# Patient Record
Sex: FEMALE | Race: WHITE | Employment: OTHER | URBAN - METROPOLITAN AREA
[De-identification: names, ages, dates, MRNs, and addresses within clinical notes are randomized per-mention and may not be internally consistent; named-entity substitution may affect disease eponyms.]

---

## 2017-10-27 PROBLEM — Z34.00 FIRST PREGNANCY: Status: ACTIVE | Noted: 2017-10-27

## 2018-01-10 PROBLEM — D25.1 INTRAMURAL LEIOMYOMA OF UTERUS: Status: ACTIVE | Noted: 2018-01-10

## 2018-02-22 PROBLEM — O99.012 ANEMIA AFFECTING PREGNANCY IN SECOND TRIMESTER: Status: ACTIVE | Noted: 2018-02-22

## 2018-02-28 ENCOUNTER — HOSPITAL ENCOUNTER (OUTPATIENT)
Dept: LAB | Age: 28
Discharge: HOME | End: 2018-02-28
Payer: COMMERCIAL

## 2018-02-28 DIAGNOSIS — O99.810 ABNORMAL GLUCOSE AFFECTING PREGNANCY: ICD-10-CM

## 2018-02-28 LAB
GLUCOSE P FAST SERPL-MCNC: 92 MG/DL
GLUCOSE, 1 HR,GTT1: 169 MG/DL
GLUCOSE, 2 HR,GTT2: 160 MG/DL
GLUCOSE, 3 HR,GTT3: 142 MG/DL

## 2018-02-28 PROCEDURE — 36415 COLL VENOUS BLD VENIPUNCTURE: CPT | Performed by: OBSTETRICS & GYNECOLOGY

## 2018-02-28 PROCEDURE — 82947 ASSAY GLUCOSE BLOOD QUANT: CPT | Performed by: OBSTETRICS & GYNECOLOGY

## 2018-02-28 PROCEDURE — 82952 GTT-ADDED SAMPLES: CPT | Performed by: OBSTETRICS & GYNECOLOGY

## 2018-02-28 PROCEDURE — 82951 GLUCOSE TOLERANCE TEST (GTT): CPT | Performed by: OBSTETRICS & GYNECOLOGY

## 2018-03-30 ENCOUNTER — HOSPITAL ENCOUNTER (EMERGENCY)
Age: 28
Discharge: HOME | End: 2018-03-30
Attending: OBSTETRICS & GYNECOLOGY | Admitting: OBSTETRICS & GYNECOLOGY
Payer: COMMERCIAL

## 2018-03-30 VITALS
HEART RATE: 76 BPM | RESPIRATION RATE: 18 BRPM | DIASTOLIC BLOOD PRESSURE: 71 MMHG | HEIGHT: 65 IN | BODY MASS INDEX: 27.99 KG/M2 | TEMPERATURE: 98.4 F | WEIGHT: 168 LBS | SYSTOLIC BLOOD PRESSURE: 128 MMHG

## 2018-03-30 PROBLEM — O26.893 ABDOMINAL PAIN DURING PREGNANCY, THIRD TRIMESTER: Status: ACTIVE | Noted: 2018-03-30

## 2018-03-30 PROBLEM — R10.9 ABDOMINAL PAIN DURING PREGNANCY, THIRD TRIMESTER: Status: ACTIVE | Noted: 2018-03-30

## 2018-03-30 LAB — FIBRONECTIN FETAL VAG QL: NEGATIVE

## 2018-03-30 PROCEDURE — 99283 EMERGENCY DEPT VISIT LOW MDM: CPT | Performed by: OBSTETRICS & GYNECOLOGY

## 2018-03-30 PROCEDURE — 82731 ASSAY OF FETAL FIBRONECTIN: CPT | Performed by: OBSTETRICS & GYNECOLOGY

## 2018-03-30 PROCEDURE — 59025 FETAL NON-STRESS TEST: CPT

## 2018-03-30 NOTE — ED PROVIDER NOTES
Chief Complaint:      24 y.o. female at 31w2d  weeks gestation who is seen for moderate abdominal pain. All day,  Working as , had back pain last night, now abdominal pain, feels \"tight\", from mid abdomen radiating to low pelvis. Rates pain 5/10, better now off her feet. Denies bleeding or LOF. Reports good fetal movement. Records reviewed, pregnancy uncomplicated thus far, 1 value abnormal on 3 hr GTT so sent for diet changes. Some constipation since starting Iron supplements. Denies bladder complaints. Reports good fetal movement. HISTORY:    History   Sexual Activity    Sexual activity: Yes    Partners: Male    Birth control/ protection: None     Comment: Pregnant     Patient's last menstrual period was 08/23/2017 (approximate). Social History     Social History    Marital status:      Spouse name: N/A    Number of children: N/A    Years of education: N/A     Occupational History    Not on file. Social History Main Topics    Smoking status: Former Smoker     Packs/day: 0.50     Years: 10.00     Quit date: 3/18/2017    Smokeless tobacco: Never Used    Alcohol use No      Comment: none since + UCG    Drug use: No    Sexual activity: Yes     Partners: Male     Birth control/ protection: None      Comment: Pregnant     Other Topics Concern    Not on file     Social History Narrative       History reviewed. No pertinent surgical history. Past Medical History:   Diagnosis Date    Anemia     History of chicken pox     in high school    Migraine          ROS:  A 12 point review of symptoms negative except for chief complaint as described above. PHYSICAL EXAM:  Blood pressure 128/71, pulse 76, temperature 98.4 °F (36.9 °C), resp. rate 18, height 5' 5\" (1.651 m), weight 76.2 kg (168 lb), last menstrual period 08/23/2017. Constitutional: The patient appears well, alert, oriented x 3. Cardiovascular: Heart RRR, no murmurs.    Respiratory: Lungs clear, no respiratory distress  GI: Abdomen soft, nontender, no guarding  No fundal tenderness  Musculoskeletal: no cva tenderness  Upper ext: no edema, reflexes +2  Lower ext: no edema, neg curtis's, reflexes +2  Skin: no rashes or lesions  Psychiatric:Mood/ Affect: appropriate  Genitourinary: SVE: closed/thick/high  FHT: 140's cat 1  TOCO: no contractions, occasional mild irratibility    Ffn negative, pt much more comfortable w resting. I personally reviewed pt's medical record including relevant labs and ultrasounds    Assessment/Plan: Abdominal pain, 31 wks, no evidence of  labor. Stable for home, follow up in office as scheduled. Discussed signs/sx PTL and indications for return.

## 2018-03-30 NOTE — PROGRESS NOTES
Pt presented to L&D triage complaining of abdominal pain 5/10. EFM on. Dr Whitney Lindsaychwaldemar called. Pt denies vaginal bleeding or LOF.

## 2018-04-09 ENCOUNTER — HOSPITAL ENCOUNTER (OUTPATIENT)
Dept: DIABETES SERVICES | Age: 28
Discharge: HOME | End: 2018-04-09
Payer: COMMERCIAL

## 2018-04-09 VITALS — WEIGHT: 170 LBS | HEIGHT: 65 IN | BODY MASS INDEX: 28.32 KG/M2

## 2018-04-09 PROCEDURE — G0109 DIAB MANAGE TRN IND/GROUP: HCPCS

## 2018-04-09 NOTE — PROGRESS NOTES
Gestational Diabetes Self-Management Support Plan    Services Provided: Pt received education on nutrition and meal planning during pregnancy. Emotional support for adjustment to diagnosis was provided. Care Plan/Recommendations:  Pt instructed to record blood sugar 4x/day and record all meals and snacks. Pt to bring information to appointments with OB. Notes for Follow Up:   1. Barriers to checking blood glucose and adherence to meal plan identified:   Pt not checking blood glucose at this time, reports she diagnosed as borderline and is hopeful to make improvements to diet to prevent any further complications with blood glucose levels. Pt to follow up with OB for monitoring.       Niya Rivera MS, RD, LD  [facility]   [phone]

## 2018-05-02 LAB — GRBS, EXTERNAL: NORMAL

## 2018-05-14 ENCOUNTER — HOSPITAL ENCOUNTER (EMERGENCY)
Age: 28
Discharge: HOME | End: 2018-05-14
Attending: OBSTETRICS & GYNECOLOGY | Admitting: OBSTETRICS & GYNECOLOGY
Payer: COMMERCIAL

## 2018-05-14 VITALS
SYSTOLIC BLOOD PRESSURE: 128 MMHG | TEMPERATURE: 98.1 F | HEART RATE: 100 BPM | DIASTOLIC BLOOD PRESSURE: 77 MMHG | RESPIRATION RATE: 16 BRPM

## 2018-05-14 PROBLEM — R10.9 ABDOMINAL PAIN DURING PREGNANCY IN THIRD TRIMESTER: Status: ACTIVE | Noted: 2018-05-14

## 2018-05-14 PROBLEM — O26.893 ABDOMINAL PAIN DURING PREGNANCY IN THIRD TRIMESTER: Status: ACTIVE | Noted: 2018-05-14

## 2018-05-14 PROCEDURE — 59025 FETAL NON-STRESS TEST: CPT | Performed by: OBSTETRICS & GYNECOLOGY

## 2018-05-14 PROCEDURE — 99283 EMERGENCY DEPT VISIT LOW MDM: CPT | Performed by: OBSTETRICS & GYNECOLOGY

## 2018-05-14 PROCEDURE — 99283 EMERGENCY DEPT VISIT LOW MDM: CPT

## 2018-05-14 NOTE — IP AVS SNAPSHOT
[address] 
[phone] Patient: Dominic Dumont MRN: [id number] JIV: About your hospitalization You were admitted on:  N/A You last received care in the:  [facility] [place], [place] 4 MADELYN You were discharged on:  May 14, 2018 Why you were hospitalized Your primary diagnosis was:  Not on File Your diagnoses also included:  Abdominal Pain During Pregnancy In Third Trimester Follow-up Information Follow up With Details Comments Contact Info JOSE Aleman   [id number] [facility] [address] 
[phone] Your Scheduled Appointments Thursday May 17, 2018 10:25 AM EDT  
OB VISIT with Yesi Silva MD  
[facility]) [address]  [address]  
[phone] Discharge Orders None A check tawanda indicates which time of day the medication should be taken. My Medications ASK your doctor about these medications Instructions Each Dose to Equal  
 Morning Noon Evening Bedtime  
 ferrous sulfate 325 mg (65 mg iron) tablet Commonly known as:  Iron Your last dose was: Your next dose is: Take 1 Tab by mouth three (3) times daily (with meals). 325 mg  
    
   
   
   
  
 ONE-A-DAY WOMEN'S PRENATAL 1 28 mg iron- 800 mcg-235 mg Cap Generic drug:  prenatal 123-iron-folic-[id number] Your last dose was: Your next dose is: Take  by mouth. Discharge Instructions Pregnancy Precautions: Care Instructions Your Care Instructions There is no sure way to prevent labor before your due date ( labor) or to prevent most other pregnancy problems. But there are things you can do to increase your chances of a healthy pregnancy. Go to your appointments, follow your doctor's advice, and take good care of yourself. Eat well, and exercise (if your doctor agrees). And make sure to drink plenty of water. Follow-up care is a key part of your treatment and safety. Be sure to make and go to all appointments, and call your doctor if you are having problems. It's also a good idea to know your test results and keep a list of the medicines you take. How can you care for yourself at home? · Make sure you go to your prenatal appointments. At each visit, your doctor will check your blood pressure. Your doctor will also check to see if you have protein in your urine. High blood pressure and protein in urine are signs of preeclampsia. This condition can be dangerous for you and your baby. · Drink plenty of fluids, enough so that your urine is light yellow or clear like water. Dehydration can cause contractions. If you have kidney, heart, or liver disease and have to limit fluids, talk with your doctor before you increase the amount of fluids you drink. · Tell your doctor right away if you notice any symptoms of an infection, such as: ¨ Burning when you urinate. ¨ A foul-smelling discharge from your vagina. ¨ Vaginal itching. ¨ Unexplained fever. ¨ Unusual pain or soreness in your uterus or lower belly. · Eat a balanced diet. Include plenty of foods that are high in calcium and iron. ¨ Foods high in calcium include milk, cheese, yogurt, almonds, and broccoli. ¨ Foods high in iron include red meat, shellfish, poultry, eggs, beans, raisins, whole-grain bread, and leafy green vegetables. · Do not smoke. If you need help quitting, talk to your doctor about stop-smoking programs and medicines. These can increase your chances of quitting for good. · Do not drink alcohol or use illegal drugs. · Follow your doctor's directions about activity. Your doctor will let you know how much, if any, exercise you can do. · Ask your doctor if you can have sex. If you are at risk for early labor, your doctor may ask you to not have sex. · Take care to prevent falls. During pregnancy, your joints are loose, and your balance is off. Sports such as bicycling, skiing, or in-line skating can increase your risk of falling. And don't ride horses or motorcycles, dive, water ski, scuba dive, or parachute jump while you are pregnant. · Avoid getting very hot. Do not use saunas or hot tubs. Avoid staying out in the sun in hot weather for long periods. Take acetaminophen (Tylenol) to lower a high fever. · Do not take any over-the-counter or herbal medicines or supplements without talking to your doctor or pharmacist first. 
When should you call for help? Call 911 anytime you think you may need emergency care. For example, call if: 
? · You passed out (lost consciousness). ? · You have severe vaginal bleeding. ? · You have severe pain in your belly or pelvis. ? · You have had fluid gushing or leaking from your vagina and you know or think the umbilical cord is bulging into your vagina. If this happens, immediately get down on your knees so your rear end (buttocks) is higher than your head. This will decrease the pressure on the cord until help arrives. ?Call your doctor now or seek immediate medical care if: 
? · You have signs of preeclampsia, such as: 
¨ Sudden swelling of your face, hands, or feet. ¨ New vision problems (such as dimness or blurring). ¨ A severe headache. ? · You have any vaginal bleeding. ? · You have belly pain or cramping. ? · You have a fever. ? · You have had regular contractions (with or without pain) for an hour. This means that you have 8 or more within 1 hour or 4 or more in 20 minutes after you change your position and drink fluids. ? · You have a sudden release of fluid from your vagina. ? · You have low back pain or pelvic pressure that does not go away.   
? · You notice that your baby has stopped moving or is moving much less than normal.  
 ?Watch closely for changes in your health, and be sure to contact your doctor if you have any problems. Where can you learn more? Go to http://francheska-erasmo.info/. Enter [id number] in the search box to learn more about \"Pregnancy Precautions: Care Instructions. \" Current as of: March 16, 2017 Content Version: 11.4 © 9204-2960 [employer]. Care instructions adapted under license by [employer] (which disclaims liability or warranty for this information). If you have questions about a medical condition or this instruction, always ask your healthcare professional. [address] any warranty or liability for your use of this information. Introducing [facility] [place] [facility]! Dear Jahaira Jara: Thank you for requesting a [employer] account. Our records indicate that you already have an active [employer] account. You can access your account anytime at https://[employer]. [employer] Did you know that you can access your hospital and ER discharge instructions at any time in [employer]? You can also review all of your test results from your hospital stay or ER visit. Additional Information If you have questions, please visit the Frequently Asked Questions section of the [employer] website at https://[employer]. [employer]/. Remember, THER[employer] is NOT to be used for urgent needs. For medical emergencies, dial 911. Now available from your iPhone and Android! Introducing Nicolas Sun As a [facility] patient, I wanted to make you aware of our electronic visit tool called Nicolas Sun. [facility] 24/7 allows you to connect within minutes with a medical provider 24 hours a day, seven days a week via a mobile device or tablet or logging into a secure website from your computer. You can access Nicolas Sun from anywhere in the [place].  
 
A virtual visit might be right for you when you have a simple condition and feel like you just dont want to get out of bed, or cant get away from work for an appointment, when your regular [employer] provider is not available (evenings, weekends or holidays), or when youre out of town and need minor care. Electronic visits cost only $49 and if the [employer] 24/7 provider determines a prescription is needed to treat your condition, one can be electronically transmitted to a nearby pharmacy*. Please take a moment to enroll today if you have not already done so. The enrollment process is free and takes just a few minutes. To enroll, please download the [employer] 24/7 christine to your tablet or phone, or visit [email or web address] to enroll on your computer. And, as an [address] [place] [address] patient with a [employer] account, the results of your visits will be scanned into your electronic medical record and your primary care provider will be able to view the scanned results. We urge you to continue to see your regular [employer] provider for your ongoing medical care. And while your primary care provider may not be the one available when you seek a "[employer]" virtual visit, the peace of mind you get from getting a real diagnosis real time can be priceless. For more information on "[employer]", view our Frequently Asked Questions (FAQs) at [email or web address]. Sincerely, 
 
Nicola Benavidez MD 
Chief Medical Officer Charlie Pia Connors *:  certain medications cannot be prescribed via "[employer]" Providers Seen During Your Hospitalization Provider Specialty Primary office phone Robert Bolivar MD Obstetrics & Gynecology [phone] Your Primary Care Physician (PCP) Primary Care Physician Office Phone Office Fax Piyush Bravo [phone] You are allergic to the following No active allergies Recent Documentation OB Status Smoking Status Pregnant Former Smoker Emergency Contacts Name Discharge Info Relation Home Work Mobile Esteban Beckman  Spouse [3] [phone] Patient Belongings The following personal items are in your possession at time of discharge: 
                             
 
  
  
 Please provide this summary of care documentation to your next provider. Signatures-by signing, you are acknowledging that this After Visit Summary has been reviewed with you and you have received a copy. Patient Signature:  ____________________________________________________________ Date:  ____________________________________________________________  
  
Noel Lipscomb Provider Signature:  ____________________________________________________________ Date:  ____________________________________________________________

## 2018-05-14 NOTE — PROGRESS NOTES
Pt arrived to L&D triage with c/o vaginal dark clot. Dr. Annmarie Homans in room, SVE 3/80/-2. Orders to continue monitoring until further notice.

## 2018-05-14 NOTE — ED PROVIDER NOTES
Chief Complaint:vag bleeding      24 y.o. female G1 at 37w5d  weeks gestation who is seen for vaginal bleeding. Pt reports she was checked in the office today by Dr. Juan Ge. When she arrived home, she had a blood clot that she passed vaginally. Good fetal movement. Reports mild to moderate contractions. No loss of fluid. Good fetal movement. No other c/o        HISTORY:    History   Sexual Activity    Sexual activity: Yes    Partners: Male    Birth control/ protection: None     Comment: Pregnant     Patient's last menstrual period was 08/23/2017 (approximate). Social History     Social History    Marital status:      Spouse name: N/A    Number of children: N/A    Years of education: N/A     Occupational History    Not on file. Social History Main Topics    Smoking status: Former Smoker     Packs/day: 0.50     Years: 10.00     Quit date: 3/18/2017    Smokeless tobacco: Never Used    Alcohol use No      Comment: none since + UCG    Drug use: No    Sexual activity: Yes     Partners: Male     Birth control/ protection: None      Comment: Pregnant     Other Topics Concern    Not on file     Social History Narrative       History reviewed. No pertinent surgical history. Past Medical History:   Diagnosis Date    Anemia     History of chicken pox     in high school    Migraine          ROS:  A 12 point review of symptoms negative except for chief complaint as described above. PHYSICAL EXAM:  Blood pressure 128/77, pulse 100, temperature 98.1 °F (36.7 °C), resp. rate 16, last menstrual period 08/23/2017. Constitutional: The patient appears well, alert, oriented x 3. Cardiovascular: Heart RRR, no murmurs.    Respiratory: Lungs clear, no respiratory distress  GI: Abdomen soft, nontender, no guarding  No fundal tenderness  Musculoskeletal: no cva tenderness  Upper ext: no edema, reflexes +2  Lower ext: no edema, neg curtis's, reflexes +2  Skin: no rashes or lesions  Psychiatric:Mood/ Affect: appropriate  Genitourinary: SVE:4/80/-2  FHT:reactive, cat 1  TOCO: q 2-5    I personally reviewed pt's medical record including relevant labs and ultrasounds    Assessment/Plan:  23 yo G1 at 37w5d with vag bleeding- suspect from cervical change  A+  Reactive nst  Home with precautions

## 2018-05-14 NOTE — DISCHARGE INSTRUCTIONS
Pregnancy Precautions: Care Instructions  Your Care Instructions    There is no sure way to prevent labor before your due date ( labor) or to prevent most other pregnancy problems. But there are things you can do to increase your chances of a healthy pregnancy. Go to your appointments, follow your doctor's advice, and take good care of yourself. Eat well, and exercise (if your doctor agrees). And make sure to drink plenty of water. Follow-up care is a key part of your treatment and safety. Be sure to make and go to all appointments, and call your doctor if you are having problems. It's also a good idea to know your test results and keep a list of the medicines you take. How can you care for yourself at home? · Make sure you go to your prenatal appointments. At each visit, your doctor will check your blood pressure. Your doctor will also check to see if you have protein in your urine. High blood pressure and protein in urine are signs of preeclampsia. This condition can be dangerous for you and your baby. · Drink plenty of fluids, enough so that your urine is light yellow or clear like water. Dehydration can cause contractions. If you have kidney, heart, or liver disease and have to limit fluids, talk with your doctor before you increase the amount of fluids you drink. · Tell your doctor right away if you notice any symptoms of an infection, such as:  ¨ Burning when you urinate. ¨ A foul-smelling discharge from your vagina. ¨ Vaginal itching. ¨ Unexplained fever. ¨ Unusual pain or soreness in your uterus or lower belly. · Eat a balanced diet. Include plenty of foods that are high in calcium and iron. ¨ Foods high in calcium include milk, cheese, yogurt, almonds, and broccoli. ¨ Foods high in iron include red meat, shellfish, poultry, eggs, beans, raisins, whole-grain bread, and leafy green vegetables. · Do not smoke.  If you need help quitting, talk to your doctor about stop-smoking programs and medicines. These can increase your chances of quitting for good. · Do not drink alcohol or use illegal drugs. · Follow your doctor's directions about activity. Your doctor will let you know how much, if any, exercise you can do. · Ask your doctor if you can have sex. If you are at risk for early labor, your doctor may ask you to not have sex. · Take care to prevent falls. During pregnancy, your joints are loose, and your balance is off. Sports such as bicycling, skiing, or in-line skating can increase your risk of falling. And don't ride horses or motorcycles, dive, water ski, scuba dive, or parachute jump while you are pregnant. · Avoid getting very hot. Do not use saunas or hot tubs. Avoid staying out in the sun in hot weather for long periods. Take acetaminophen (Tylenol) to lower a high fever. · Do not take any over-the-counter or herbal medicines or supplements without talking to your doctor or pharmacist first.  When should you call for help? Call 911 anytime you think you may need emergency care. For example, call if:  ? · You passed out (lost consciousness). ? · You have severe vaginal bleeding. ? · You have severe pain in your belly or pelvis. ? · You have had fluid gushing or leaking from your vagina and you know or think the umbilical cord is bulging into your vagina. If this happens, immediately get down on your knees so your rear end (buttocks) is higher than your head. This will decrease the pressure on the cord until help arrives. ?Call your doctor now or seek immediate medical care if:  ? · You have signs of preeclampsia, such as:  ¨ Sudden swelling of your face, hands, or feet. ¨ New vision problems (such as dimness or blurring). ¨ A severe headache. ? · You have any vaginal bleeding. ? · You have belly pain or cramping. ? · You have a fever. ? · You have had regular contractions (with or without pain) for an hour.  This means that you have 8 or more within 1 hour or 4 or more in 20 minutes after you change your position and drink fluids. ? · You have a sudden release of fluid from your vagina. ? · You have low back pain or pelvic pressure that does not go away. ? · You notice that your baby has stopped moving or is moving much less than normal.   ? Watch closely for changes in your health, and be sure to contact your doctor if you have any problems. Where can you learn more? Go to http://francheska-erasmo.info/. Enter [id number] in the search box to learn more about \"Pregnancy Precautions: Care Instructions. \"  Current as of: March 16, 2017  Content Version: 11.4  © 3065-0368 [employer]. Care instructions adapted under license by [employer] (which disclaims liability or warranty for this information). If you have questions about a medical condition or this instruction, always ask your healthcare professional. [address] any warranty or liability for your use of this information.

## 2018-05-16 ENCOUNTER — HOSPITAL ENCOUNTER (EMERGENCY)
Age: 28
Discharge: HOME | End: 2018-05-16
Attending: OBSTETRICS & GYNECOLOGY | Admitting: OBSTETRICS & GYNECOLOGY
Payer: COMMERCIAL

## 2018-05-16 VITALS
RESPIRATION RATE: 20 BRPM | SYSTOLIC BLOOD PRESSURE: 127 MMHG | DIASTOLIC BLOOD PRESSURE: 80 MMHG | TEMPERATURE: 98.7 F | HEIGHT: 65 IN | BODY MASS INDEX: 29.16 KG/M2 | HEART RATE: 96 BPM | WEIGHT: 175 LBS

## 2018-05-16 PROCEDURE — 59025 FETAL NON-STRESS TEST: CPT

## 2018-05-16 PROCEDURE — 99283 EMERGENCY DEPT VISIT LOW MDM: CPT

## 2018-05-16 NOTE — DISCHARGE INSTRUCTIONS
Early Stage of Labor at Home: Care Instructions  Your Care Instructions  If you came to the hospital while in early labor, your doctor may have asked if you want to labor at home until your contractions are stronger. Many women stay at home during early labor. This is often the longest part of the birthing process. It may last up to 2 to 3 days. Contractions are mild to moderate and shorter (about 30 to 45 seconds). You can usually keep talking during them. Contractions may also be irregular, about 5 to 20 minutes apart. They may even stop for a while. It helps to stay as relaxed as you can during this time. You can spend some or all of your early labor at home or anywhere else you may be comfortable. If you live far from the hospital or birthing center, you may want to think about going somewhere nearby so you can get back to the hospital quickly. For some women, there may be benefits to staying home during early labor, such as avoiding medicines or procedures. As labor progresses, you'll shift from early labor to active labor. During this time, contractions get more intense. They occur more often, about every 2 to 3 minutes. They also last longer, about 50 to 70 seconds. You will feel them even when you change positions and walk or move around. It may be hard to tell if you are in active labor. If you aren't sure, call your doctor or midwife. As your labor progresses, check in with your doctor or midwife about when to come back to the hospital or birthing center. You may have special instructions if your water broke or you tested positive for group B strep. Follow-up care is a key part of your treatment and safety. Be sure to make and go to all appointments, and call your doctor if you are having problems. It's also a good idea to know your test results and keep a list of the medicines you take. How can you care for yourself at home? · Get support.  Having a support person with you from early labor until after childbirth can have a positive effect on childbirth. · Find distractions. During early labor, you can walk, play cards, watch TV, or listen to music to help take your mind off your contractions. · Ask your partner, labor , or  for a massage. Shoulder and low back massage during contractions may ease your pain. Strong massage of the back muscles (counterpressure) during contractions may help relieve the pain of back labor. Tell your labor  exactly where to push and how hard to push. · Use imagery. This means using your imagination to decrease your pain. For instance, to help manage pain, picture your contractions as waves rolling over you. Picture a peaceful place, such as a beach or mountain stream, to help you relax between contractions. · Change positions during labor. Walking, kneeling, or sitting on a big rubber ball (birth ball) are good options. · Use focused breathing techniques. Breathing in a rhythm can distract you from pain. · Take a warm shower or bath. Warm water may ease pain and stress. When should you call for help? Call 911 anytime you think you may need emergency care. For example, call if:  ? · You passed out (lost consciousness). ? · You have severe vaginal bleeding. ? · You have severe pain in your belly or pelvis. ? · You have had fluid gushing or leaking from your vagina and you know or think the umbilical cord is bulging into your vagina. If this happens, immediately get down on your knees so your rear end (buttocks) is higher than your head. This will decrease the pressure on the cord until help arrives. ?Call your doctor now or seek immediate medical care if:  ? · You have new or worse signs of preeclampsia, such as:  ¨ Sudden swelling of your face, hands, or feet. ¨ New vision problems (such as dimness or blurring). ¨ A severe headache. ? · You have any vaginal bleeding. ? · You have belly pain or cramping. ? · You have a fever.    ? · You have had regular contractions (with or without pain) for an hour. This means that you have 8 or more within 1 hour or 4 or more in 20 minutes after you change your position and drink fluids. ? · You have a sudden release of fluid from your vagina. ? · You have low back pain or pelvic pressure that does not go away. ? · You notice that your baby has stopped moving or is moving much less than normal.   ? Watch closely for changes in your health, and be sure to contact your doctor if you have any problems. Where can you learn more? Go to http://francheska-erasmo.info/. Enter M414 in the search box to learn more about \"Early Stage of Labor at Home: Care Instructions. \"  Current as of: March 16, 2017  Content Version: 11.4  © 3856-6315 [employer]. Care instructions adapted under license by [facility] (which disclaims liability or warranty for this information). If you have questions about a medical condition or this instruction, always ask your healthcare professional. [address] any warranty or liability for your use of this information.

## 2018-05-16 NOTE — IP AVS SNAPSHOT
[address] 
[phone] Patient: Tamanna Garcia MRN: [id number] PFR:0/27/6731 About your hospitalization You were admitted on:  N/A You last received care in the:  [facility] You were discharged on:  May 16, 2018 Why you were hospitalized Your primary diagnosis was:  Not on File Your diagnoses also included:  Abdominal Pain During Pregnancy In Third Trimester Follow-up Information None Your Scheduled Appointments Thursday May 17, 2018 10:25 AM EDT  
OB VISIT with Keeley Haley MD  
[facility]) [address]  [address] [phone] Discharge Orders None A check tawanda indicates which time of day the medication should be taken. My Medications ASK your doctor about these medications Instructions Each Dose to Equal  
 Morning Noon Evening Bedtime  
 ferrous sulfate 325 mg (65 mg iron) tablet Commonly known as:  Iron Your last dose was: Your next dose is: Take 1 Tab by mouth three (3) times daily (with meals). 325 mg  
    
   
   
   
  
 ONE-A-DAY WOMEN'S PRENATAL 1 28 mg iron- 800 mcg-235 mg Cap Generic drug:  prenatal 123-iron-folic-omeg3s Your last dose was: Your next dose is: Take  by mouth. Discharge Instructions Early Stage of Labor at Home: Care Instructions Your Care Instructions If you came to the hospital while in early labor, your doctor may have asked if you want to labor at home until your contractions are stronger. Many women stay at home during early labor. This is often the longest part of the birthing process. It may last up to 2 to 3 days. Contractions are mild to moderate and shorter (about 30 to 45 seconds).  You can usually keep talking during them. Contractions may also be irregular, about 5 to 20 minutes apart. They may even stop for a while. It helps to stay as relaxed as you can during this time. You can spend some or all of your early labor at home or anywhere else you may be comfortable. If you live far from the hospital or birthing center, you may want to think about going somewhere nearby so you can get back to the hospital quickly. For some women, there may be benefits to staying home during early labor, such as avoiding medicines or procedures. As labor progresses, you'll shift from early labor to active labor. During this time, contractions get more intense. They occur more often, about every 2 to 3 minutes. They also last longer, about 50 to 70 seconds. You will feel them even when you change positions and walk or move around. It may be hard to tell if you are in active labor. If you aren't sure, call your doctor or midwife. As your labor progresses, check in with your doctor or midwife about when to come back to the hospital or birthing center. You may have special instructions if your water broke or you tested positive for group B strep. Follow-up care is a key part of your treatment and safety. Be sure to make and go to all appointments, and call your doctor if you are having problems. It's also a good idea to know your test results and keep a list of the medicines you take. How can you care for yourself at home? · Get support. Having a support person with you from early labor until after childbirth can have a positive effect on childbirth. · Find distractions. During early labor, you can walk, play cards, watch TV, or listen to music to help take your mind off your contractions. · Ask your partner, labor , or  for a massage. Shoulder and low back massage during contractions may ease your pain.  Strong massage of the back muscles (counterpressure) during contractions may help relieve the pain of back labor. Tell your labor  exactly where to push and how hard to push. · Use imagery. This means using your imagination to decrease your pain. For instance, to help manage pain, picture your contractions as waves rolling over you. Picture a peaceful place, such as a beach or mountain stream, to help you relax between contractions. · Change positions during labor. Walking, kneeling, or sitting on a big rubber ball (birth ball) are good options. · Use focused breathing techniques. Breathing in a rhythm can distract you from pain. · Take a warm shower or bath. Warm water may ease pain and stress. When should you call for help? Call 911 anytime you think you may need emergency care. For example, call if: 
? · You passed out (lost consciousness). ? · You have severe vaginal bleeding. ? · You have severe pain in your belly or pelvis. ? · You have had fluid gushing or leaking from your vagina and you know or think the umbilical cord is bulging into your vagina. If this happens, immediately get down on your knees so your rear end (buttocks) is higher than your head. This will decrease the pressure on the cord until help arrives. ?Call your doctor now or seek immediate medical care if: 
? · You have new or worse signs of preeclampsia, such as: 
¨ Sudden swelling of your face, hands, or feet. ¨ New vision problems (such as dimness or blurring). ¨ A severe headache. ? · You have any vaginal bleeding. ? · You have belly pain or cramping. ? · You have a fever. ? · You have had regular contractions (with or without pain) for an hour. This means that you have 8 or more within 1 hour or 4 or more in 20 minutes after you change your position and drink fluids. ? · You have a sudden release of fluid from your vagina. ? · You have low back pain or pelvic pressure that does not go away.   
? · You notice that your baby has stopped moving or is moving much less than normal.  
 ?Watch closely for changes in your health, and be sure to contact your doctor if you have any problems. Where can you learn more? Go to http://francheska-erasmo.info/. Enter V461 in the search box to learn more about \"Early Stage of Labor at Home: Care Instructions. \" Current as of: March 16, 2017 Content Version: 11.4 © 0230-7184 [employer]. Care instructions adapted under license by [employer] (which disclaims liability or warranty for this information). If you have questions about a medical condition or this instruction, always ask your healthcare professional. [address] any warranty or liability for your use of this information. Introducing [facility]! Dear [facility]: Thank you for requesting a [employer] account. Our records indicate that you already have an active [employer] account. You can access your account anytime at [email or web address] Did you know that you can access your hospital and ER discharge instructions at any time in [employer]? You can also review all of your test results from your hospital stay or ER visit. Additional Information If you have questions, please visit the Frequently Asked Questions section of the [employer] website at [email or web address] Remember, [employer] is NOT to be used for urgent needs. For medical emergencies, dial 911. Now available from your iPhone and Android! Introducing Nicolas Sun As a [facility] patient, I wanted to make you aware of our electronic visit tool called Nicolas Sun. [facility] 24/7 allows you to connect within minutes with a medical provider 24 hours a day, seven days a week via a mobile device or tablet or logging into a secure website from your computer. You can access Nicolas Sun from anywhere in the [place].  
 
A virtual visit might be right for you when you have a simple condition and feel like you just dont want to get out of bed, or cant get away from work for an appointment, when your regular [facility] provider is not available (evenings, weekends or holidays), or when youre out of town and need minor care. Electronic visits cost only $49 and if the [facility] 24/7 provider determines a prescription is needed to treat your condition, one can be electronically transmitted to a nearby pharmacy*. Please take a moment to enroll today if you have not already done so. The enrollment process is free and takes just a few minutes. To enroll, please download the tidy/Teran[employer] christine to your tablet or phone, or visit [email or web address] to enroll on your computer. And, as an [address] [place] [address] patient with a [employer] account, the results of your visits will be scanned into your electronic medical record and your primary care provider will be able to view the scanned results. We urge you to continue to see your regular [facility] provider for your ongoing medical care. And while your primary care provider may not be the one available when you seek a [employer] virtual visit, the peace of mind you get from getting a real diagnosis real time can be priceless. For more information on Nicolas [employer], view our Frequently Asked Questions (FAQs) at [email or web address]. Sincerely, 
 
Rita Hernandez MD 
Chief Medical Officer [employer] *:  certain medications cannot be prescribed via [employer] Providers Seen During Your Hospitalization Provider Specialty Primary office phone Sara Hernández MD Obstetrics & Gynecology [phone] Your Primary Care Physician (PCP) Primary Care Physician Office Phone Office Fax Pauline Owens [phone] You are allergic to the following No active allergies Recent Documentation Height Weight BMI OB Status Smoking Status 1.651 m 79.4 kg 29.12 kg/m2 Pregnant Former Smoker Emergency Contacts Name Discharge Info Relation Home Work Mobile Esteban Beckman  Spouse [3] [phone] Patient Belongings The following personal items are in your possession at time of discharge: 
                             
 
  
  
 Please provide this summary of care documentation to your next provider. Signatures-by signing, you are acknowledging that this After Visit Summary has been reviewed with you and you have received a copy. Patient Signature:  ____________________________________________________________ Date:  ____________________________________________________________  
  
[facility] Provider Signature:  ____________________________________________________________ Date:  ____________________________________________________________

## 2018-05-16 NOTE — PROGRESS NOTES
Patient without complaints contractions have spaced out to every 8-10 minutes significant other at bedside.

## 2018-05-16 NOTE — IP AVS SNAPSHOT
[address] 
[phone] Patient: Anne Espino MRN: [id number] AFE:5/38/9328 A check tawanda indicates which time of day the medication should be taken. My Medications ASK your doctor about these medications Instructions Each Dose to Equal  
 Morning Noon Evening Bedtime  
 ferrous sulfate 325 mg (65 mg iron) tablet Commonly known as:  Iron Your last dose was: Your next dose is: Take 1 Tab by mouth three (3) times daily (with meals). 325 mg  
    
   
   
   
  
 ONE-A-DAY WOMEN'S PRENATAL 1 28 mg iron- 800 mcg-235 mg Cap Generic drug:  prenatal 123-iron-folic-omeg3s Your last dose was: Your next dose is: Take  by mouth.

## 2018-05-16 NOTE — H&P
CC  No chief complaint on file. History:    24 y.o. female at 38w0d weeks gestation who requesting evaluation for Uterine contractions. Was checked 2 days ago and was 80/4. Contractions started about 1.5 hours ago. No significant vb/no lof. Fetal movement has been normal  HISTORY:  OB History    Para Term  AB Living   1        SAB TAB Ectopic Molar Multiple Live Births              # Outcome Date GA Lbr Jeremiah/2nd Weight Sex Delivery Anes PTL Lv   1 Current                   History   Sexual Activity    Sexual activity: Yes    Partners: Male    Birth control/ protection: None     Comment: Pregnant     Patient's last menstrual period was 2017 (approximate). Social History     Social History    Marital status:      Spouse name: N/A    Number of children: N/A    Years of education: N/A     Occupational History    Not on file. Social History Main Topics    Smoking status: Former Smoker     Packs/day: 0.50     Years: 10.00     Quit date: 3/18/2017    Smokeless tobacco: Never Used    Alcohol use No      Comment: none since + UCG    Drug use: No    Sexual activity: Yes     Partners: Male     Birth control/ protection: None      Comment: Pregnant     Other Topics Concern    Not on file     Social History Narrative       No past surgical history on file. Past Medical History:   Diagnosis Date    Anemia     History of chicken pox     in high school    Migraine          ROS:  Negative:  headache , nausea and vomiting, vaginal bleeding  and visual disturbances. Positive:  contractions. PHYSICAL EXAM:  Last menstrual period 2017. General: well developed and well nourished  Resp:  breath sounds clear and equal bilaterally  Card:  RRR, no MRG  Abd: WNL.      Uterine contractions: regular, every 4 minutes    Fetal Assessment: Baseline FHR: 140s per minute     Fetal heart variability: moderate     Fetal Heart Rate decelerations: none     Fetal Heart Rate accelerations: yes     Prestentation: vertex by exam,    Pelvic:   External- normal EGBSU w/o lesions     SVE- Cervical Exam: 4 cm dilated    80% effaced    -1 station    Presenting Part: cephalic     Ext: edema, clonus and DTR's normal    I have personally reviewed the patient's history, prenatal record, and pertinent test results. Assessment:  24 y.o. female at 38w0d weeks gestation  Reassuring fetal status  Early latent labor.         Plan:  will observe and recheck as contractions just started and she lives a good distance from hospital.    Blanquita Prather MD

## 2018-05-16 NOTE — PROGRESS NOTES
Patient presents to triage with complaints of contractions every 5-7 minutes since 0245, denies leaking or sudden gush of fluid, some light pink spotting and good fetal movement.

## 2018-05-16 NOTE — PROGRESS NOTES
Dr Reyes Baxter in room to assess patient. SVE 4/80/-1. Patient states that she lives approximately 45 minutes from hospital, will monitor x 2 hours then recheck for cervical change. Spouse at bedside.

## 2018-05-16 NOTE — PROGRESS NOTES
Patient discharged home with labor precautions, patient and significant other verbalize understanding of these.

## 2018-05-17 ENCOUNTER — HOSPITAL ENCOUNTER (INPATIENT)
Age: 28
LOS: 2 days | Discharge: HOME | End: 2018-05-19
Attending: OBSTETRICS & GYNECOLOGY | Admitting: OBSTETRICS & GYNECOLOGY
Payer: COMMERCIAL

## 2018-05-17 ENCOUNTER — ANESTHESIA EVENT (OUTPATIENT)
Dept: LABOR AND DELIVERY | Age: 28
End: 2018-05-17
Payer: COMMERCIAL

## 2018-05-17 ENCOUNTER — ANESTHESIA (OUTPATIENT)
Dept: LABOR AND DELIVERY | Age: 28
End: 2018-05-17
Payer: COMMERCIAL

## 2018-05-17 PROBLEM — O42.90 SPROM (PROLONGED SPONTANEOUS RUPTURE OF MEMBRANES): Status: RESOLVED | Noted: 2018-05-17 | Resolved: 2018-05-17

## 2018-05-17 PROBLEM — R10.9 ABDOMINAL PAIN DURING PREGNANCY IN THIRD TRIMESTER: Status: RESOLVED | Noted: 2018-05-14 | Resolved: 2018-05-17

## 2018-05-17 PROBLEM — Z34.03 ENCOUNTER FOR SUPERVISION OF NORMAL FIRST PREGNANCY IN THIRD TRIMESTER: Status: ACTIVE | Noted: 2018-05-17

## 2018-05-17 PROBLEM — R10.9 ABDOMINAL PAIN DURING PREGNANCY, THIRD TRIMESTER: Status: RESOLVED | Noted: 2018-03-30 | Resolved: 2018-05-17

## 2018-05-17 PROBLEM — O42.90 SPROM (PROLONGED SPONTANEOUS RUPTURE OF MEMBRANES): Status: ACTIVE | Noted: 2018-05-17

## 2018-05-17 PROBLEM — O99.012 ANEMIA AFFECTING PREGNANCY IN SECOND TRIMESTER: Status: RESOLVED | Noted: 2018-02-22 | Resolved: 2018-05-17

## 2018-05-17 PROBLEM — Z34.00 FIRST PREGNANCY: Status: RESOLVED | Noted: 2017-10-27 | Resolved: 2018-05-17

## 2018-05-17 PROBLEM — Z34.03 ENCOUNTER FOR SUPERVISION OF NORMAL FIRST PREGNANCY IN THIRD TRIMESTER: Status: RESOLVED | Noted: 2018-05-17 | Resolved: 2018-05-17

## 2018-05-17 PROBLEM — O26.893 ABDOMINAL PAIN DURING PREGNANCY, THIRD TRIMESTER: Status: RESOLVED | Noted: 2018-03-30 | Resolved: 2018-05-17

## 2018-05-17 PROBLEM — O26.893 ABDOMINAL PAIN DURING PREGNANCY IN THIRD TRIMESTER: Status: RESOLVED | Noted: 2018-05-14 | Resolved: 2018-05-17

## 2018-05-17 LAB
ABO + RH BLD: NORMAL
BASE DEFICIT BLDCOA-SCNC: 8.6 MMOL/L (ref 0–2)
BASE DEFICIT BLDCOV-SCNC: 7.8 MMOL/L (ref 1.9–7.7)
BDY SITE: ABNORMAL
BDY SITE: ABNORMAL
BLOOD GROUP ANTIBODIES SERPL: NORMAL
ERYTHROCYTE [DISTWIDTH] IN BLOOD BY AUTOMATED COUNT: 14.8 % (ref 11.9–14.6)
HCO3 BLDCOA-SCNC: 18 MMOL/L (ref 22–26)
HCO3 BLDV-SCNC: 17 MMOL/L
HCT VFR BLD AUTO: 36.5 % (ref 35.8–46.3)
HGB BLD-MCNC: 12.4 G/DL (ref 11.7–15.4)
MCH RBC QN AUTO: 30.2 PG (ref 26.1–32.9)
MCHC RBC AUTO-ENTMCNC: 34 G/DL (ref 31.4–35)
MCV RBC AUTO: 89 FL (ref 79.6–97.8)
PCO2 BLDCOA: 43 MMHG (ref 33–49)
PCO2 BLDCOV: 33 MMHG (ref 14.1–43.3)
PH BLDCOA: 7.25 [PH] (ref 7.21–7.31)
PH BLDCOV: 7.33 [PH] (ref 7.2–7.44)
PLATELET # BLD AUTO: 194 K/UL (ref 150–450)
PMV BLD AUTO: 10.5 FL (ref 10.8–14.1)
PO2 BLDCOA: 31 MMHG (ref 9–19)
PO2 BLDV: 35 MMHG (ref 30.4–57.2)
RBC # BLD AUTO: 4.1 M/UL (ref 4.05–5.25)
SERVICE CMNT-IMP: ABNORMAL
SERVICE CMNT-IMP: ABNORMAL
SPECIMEN EXP DATE BLD: NORMAL
WBC # BLD AUTO: 9.4 K/UL (ref 4.3–11.1)

## 2018-05-17 PROCEDURE — 4A1HXCZ MONITORING OF PRODUCTS OF CONCEPTION, CARDIAC RATE, EXTERNAL APPROACH: ICD-10-PCS | Performed by: OBSTETRICS & GYNECOLOGY

## 2018-05-17 PROCEDURE — 86900 BLOOD TYPING SEROLOGIC ABO: CPT | Performed by: OBSTETRICS & GYNECOLOGY

## 2018-05-17 PROCEDURE — 77030011943

## 2018-05-17 PROCEDURE — 36415 COLL VENOUS BLD VENIPUNCTURE: CPT | Performed by: OBSTETRICS & GYNECOLOGY

## 2018-05-17 PROCEDURE — 82803 BLOOD GASES ANY COMBINATION: CPT

## 2018-05-17 PROCEDURE — 75410000002 HC LABOR FEE PER 1 HR

## 2018-05-17 PROCEDURE — 74011258636 HC RX REV CODE- 258/636: Performed by: OBSTETRICS & GYNECOLOGY

## 2018-05-17 PROCEDURE — 75410000003 HC RECOV DEL/VAG/CSECN EA 0.5 HR

## 2018-05-17 PROCEDURE — 75410000000 HC DELIVERY VAGINAL/SINGLE

## 2018-05-17 PROCEDURE — 85027 COMPLETE CBC AUTOMATED: CPT | Performed by: OBSTETRICS & GYNECOLOGY

## 2018-05-17 PROCEDURE — 74011250637 HC RX REV CODE- 250/637: Performed by: OBSTETRICS & GYNECOLOGY

## 2018-05-17 PROCEDURE — 65270000029 HC RM PRIVATE

## 2018-05-17 PROCEDURE — 76060000078 HC EPIDURAL ANESTHESIA

## 2018-05-17 PROCEDURE — 74011250636 HC RX REV CODE- 250/636

## 2018-05-17 PROCEDURE — 74011250636 HC RX REV CODE- 250/636: Performed by: OBSTETRICS & GYNECOLOGY

## 2018-05-17 PROCEDURE — A4300 CATH IMPL VASC ACCESS PORTAL: HCPCS | Performed by: ANESTHESIOLOGY

## 2018-05-17 PROCEDURE — 77030018846 HC SOL IRR STRL H20 ICUM -A

## 2018-05-17 PROCEDURE — 77030014125 HC TY EPDRL BBMI -B: Performed by: ANESTHESIOLOGY

## 2018-05-17 PROCEDURE — 77030002888 HC SUT CHRMC J&J -A

## 2018-05-17 RX ORDER — SODIUM CHLORIDE 0.9 % (FLUSH) 0.9 %
5-10 SYRINGE (ML) INJECTION EVERY 8 HOURS
Status: DISCONTINUED | OUTPATIENT
Start: 2018-05-18 | End: 2018-05-19

## 2018-05-17 RX ORDER — LIDOCAINE HYDROCHLORIDE 20 MG/ML
JELLY TOPICAL
Status: DISCONTINUED | OUTPATIENT
Start: 2018-05-17 | End: 2018-05-17

## 2018-05-17 RX ORDER — BUTORPHANOL TARTRATE 2 MG/ML
1 INJECTION INTRAMUSCULAR; INTRAVENOUS
Status: DISCONTINUED | OUTPATIENT
Start: 2018-05-17 | End: 2018-05-17

## 2018-05-17 RX ORDER — SODIUM CHLORIDE 0.9 % (FLUSH) 0.9 %
5-10 SYRINGE (ML) INJECTION AS NEEDED
Status: DISCONTINUED | OUTPATIENT
Start: 2018-05-17 | End: 2018-05-19

## 2018-05-17 RX ORDER — PRENATAL VIT 96/IRON FUM/FOLIC 27MG-0.8MG
1 TABLET ORAL DAILY
Status: DISCONTINUED | OUTPATIENT
Start: 2018-05-18 | End: 2018-05-19 | Stop reason: HOSPADM

## 2018-05-17 RX ORDER — ROPIVACAINE HYDROCHLORIDE 2 MG/ML
INJECTION, SOLUTION EPIDURAL; INFILTRATION; PERINEURAL
Status: DISCONTINUED | OUTPATIENT
Start: 2018-05-17 | End: 2018-05-17 | Stop reason: HOSPADM

## 2018-05-17 RX ORDER — FENTANYL CITRATE 50 UG/ML
INJECTION, SOLUTION INTRAMUSCULAR; INTRAVENOUS AS NEEDED
Status: DISCONTINUED | OUTPATIENT
Start: 2018-05-17 | End: 2018-05-17 | Stop reason: HOSPADM

## 2018-05-17 RX ORDER — HYDROCODONE BITARTRATE AND ACETAMINOPHEN 5; 325 MG/1; MG/1
1 TABLET ORAL
Status: DISCONTINUED | OUTPATIENT
Start: 2018-05-17 | End: 2018-05-19 | Stop reason: HOSPADM

## 2018-05-17 RX ORDER — MINERAL OIL
120 OIL (ML) ORAL AS NEEDED
Status: DISCONTINUED | OUTPATIENT
Start: 2018-05-17 | End: 2018-05-17

## 2018-05-17 RX ORDER — IBUPROFEN 800 MG/1
800 TABLET ORAL
Status: DISCONTINUED | OUTPATIENT
Start: 2018-05-17 | End: 2018-05-19 | Stop reason: HOSPADM

## 2018-05-17 RX ORDER — FAMOTIDINE 20 MG/1
20 TABLET, FILM COATED ORAL ONCE
Status: DISCONTINUED | OUTPATIENT
Start: 2018-05-17 | End: 2018-05-17

## 2018-05-17 RX ORDER — SIMETHICONE 80 MG
80 TABLET,CHEWABLE ORAL
Status: DISCONTINUED | OUTPATIENT
Start: 2018-05-17 | End: 2018-05-19 | Stop reason: HOSPADM

## 2018-05-17 RX ORDER — OXYTOCIN/0.9 % SODIUM CHLORIDE 15/250 ML
250 PLASTIC BAG, INJECTION (ML) INTRAVENOUS ONCE
Status: ACTIVE | OUTPATIENT
Start: 2018-05-18 | End: 2018-05-18

## 2018-05-17 RX ORDER — SODIUM CHLORIDE 0.9 % (FLUSH) 0.9 %
5-10 SYRINGE (ML) INJECTION AS NEEDED
Status: DISCONTINUED | OUTPATIENT
Start: 2018-05-17 | End: 2018-05-17

## 2018-05-17 RX ORDER — OXYTOCIN/RINGER'S LACTATE 30/500 ML
1-25 PLASTIC BAG, INJECTION (ML) INTRAVENOUS
Status: DISCONTINUED | OUTPATIENT
Start: 2018-05-17 | End: 2018-05-17

## 2018-05-17 RX ORDER — MINERAL OIL
120 OIL (ML) ORAL
Status: COMPLETED | OUTPATIENT
Start: 2018-05-17 | End: 2018-05-17

## 2018-05-17 RX ORDER — LANOLIN ALCOHOL/MO/W.PET/CERES
1 CREAM (GRAM) TOPICAL
Status: DISCONTINUED | OUTPATIENT
Start: 2018-05-18 | End: 2018-05-19 | Stop reason: HOSPADM

## 2018-05-17 RX ORDER — SODIUM CHLORIDE 0.9 % (FLUSH) 0.9 %
5-10 SYRINGE (ML) INJECTION EVERY 8 HOURS
Status: DISCONTINUED | OUTPATIENT
Start: 2018-05-17 | End: 2018-05-17

## 2018-05-17 RX ORDER — LIDOCAINE HYDROCHLORIDE 10 MG/ML
1 INJECTION INFILTRATION; PERINEURAL
Status: DISCONTINUED | OUTPATIENT
Start: 2018-05-17 | End: 2018-05-17

## 2018-05-17 RX ORDER — DEXTROSE, SODIUM CHLORIDE, SODIUM LACTATE, POTASSIUM CHLORIDE, AND CALCIUM CHLORIDE 5; .6; .31; .03; .02 G/100ML; G/100ML; G/100ML; G/100ML; G/100ML
125 INJECTION, SOLUTION INTRAVENOUS CONTINUOUS
Status: DISCONTINUED | OUTPATIENT
Start: 2018-05-17 | End: 2018-05-17

## 2018-05-17 RX ADMIN — MINERAL OIL 120 ML: 471.95 OIL ORAL at 19:28

## 2018-05-17 RX ADMIN — OXYTOCIN 2 MILLI-UNITS/MIN: 10 INJECTION, SOLUTION INTRAMUSCULAR; INTRAVENOUS at 12:55

## 2018-05-17 RX ADMIN — MINERAL OIL 120 ML: 471.95 OIL ORAL at 21:30

## 2018-05-17 RX ADMIN — IBUPROFEN 800 MG: 800 TABLET ORAL at 23:56

## 2018-05-17 RX ADMIN — ROPIVACAINE HYDROCHLORIDE 8 ML/HR: 2 INJECTION, SOLUTION EPIDURAL; INFILTRATION; PERINEURAL at 12:46

## 2018-05-17 RX ADMIN — MINERAL OIL 120 ML: 471.95 OIL ORAL at 22:30

## 2018-05-17 RX ADMIN — FENTANYL CITRATE 100 MCG: 50 INJECTION, SOLUTION INTRAMUSCULAR; INTRAVENOUS at 18:00

## 2018-05-17 RX ADMIN — SODIUM CHLORIDE, SODIUM LACTATE, POTASSIUM CHLORIDE, CALCIUM CHLORIDE, AND DEXTROSE MONOHYDRATE 125 ML/HR: 600; 310; 30; 20; 5 INJECTION, SOLUTION INTRAVENOUS at 12:56

## 2018-05-17 RX ADMIN — ROPIVACAINE HYDROCHLORIDE: 2 INJECTION, SOLUTION EPIDURAL; INFILTRATION; PERINEURAL at 20:36

## 2018-05-17 NOTE — PROGRESS NOTES
Here for admission at 4-5 cms with leaking fluid per dr Tahmina Mendoza. penicillin to be given for gbs positive status. Hx of positive uds.

## 2018-05-17 NOTE — PROGRESS NOTES
Dr Angeline Gomez called and updated to pt status- comfortable with her epidural. Orders to start pitocin augmentation, reactive tracing.

## 2018-05-17 NOTE — PROGRESS NOTES
Labor Progress Note  Patient seen, fetal heart rate and contraction pattern evaluated, patient examined.     Physical Exam:  Cervical Exam:  4-5cm dilated    90% effaced    -1 station      Uterine Activity: Frequency: Every 3-5 minutes and Intensity: moderate  Fetal Heart Rate: Reactive    Assessment/Plan:  Reassuring fetal status, Continue plan for vaginal delivery

## 2018-05-17 NOTE — PROGRESS NOTES
In and out cath for 500 mls/ pericare, pad change. Pt repositioned to the left side. Peanut utilized. Pt comfortable.

## 2018-05-17 NOTE — PROGRESS NOTES
Shift assessment complete. See flowsheet for details. POC reviewed with pt and pt verbalized understanding. Pt oriented to room and has call light within reach. Pt denies any needs at this time but will call out PRN. Pt now resting in bed with  at bedside.

## 2018-05-17 NOTE — ANESTHESIA PROCEDURE NOTES
Epidural Block    Start time: 5/17/2018 12:35 PM  End time: 5/17/2018 12:43 PM  Performed by: Rishi Arguello by: Mejia Morin     Pre-Procedure  Indication: labor epidural    Preanesthetic Checklist: patient identified, risks and benefits discussed, anesthesia consent, patient being monitored, timeout performed and anesthesia consent    Timeout Time: 12:34        Epidural:   Patient position:  Seated  Prep region:  Lumbar  Prep: Patient draped and Chlorhexidine    Location:  L3-4    Needle and Epidural Catheter:   Needle Type:  Tuohy  Needle Gauge:  17 G  Injection Technique:  Loss of resistance using saline  Attempts:  1  Catheter Size:  20 G  Catheter at Skin Depth (cm):  8.5  Depth in Epidural Space (cm):  5  Events: no blood with aspiration, no cerebrospinal fluid with aspiration, no paresthesia and negative aspiration test    Test Dose:  Lidocaine 1.5% w/ epi and negative (4cc)    Assessment:   Catheter Secured:  Tegaderm  Insertion:  Uncomplicated  Patient tolerance:  Patient tolerated the procedure well with no immediate complications

## 2018-05-17 NOTE — ANESTHESIA PREPROCEDURE EVALUATION
Anesthetic History   No history of anesthetic complications            Review of Systems / Medical History  Patient summary reviewed and pertinent labs reviewed    Pulmonary  Within defined limits                 Neuro/Psych   Within defined limits           Cardiovascular  Within defined limits                Exercise tolerance: >4 METS  Comments: Denies recent CP, SOB or Palpitations   GI/Hepatic/Renal  Within defined limits              Endo/Other             Other Findings              Physical Exam    Airway  Mallampati: II  TM Distance: 4 - 6 cm  Neck ROM: normal range of motion   Mouth opening: Normal     Cardiovascular  Regular rate and rhythm,  S1 and S2 normal,  no murmur, click, rub, or gallop             Dental  No notable dental hx       Pulmonary  Breath sounds clear to auscultation               Abdominal  GI exam deferred       Other Findings            Anesthetic Plan    ASA: 2  Anesthesia type: epidural            Anesthetic plan and risks discussed with: Patient

## 2018-05-17 NOTE — H&P
History & Physical    Name: Kristopher Post MRN: [id number]  SSN: [id number]    YOB: 1990  Age: 24 y.o. Sex: female        Subjective:     Estimated Date of Delivery: 18  OB History    Para Term  AB Living   1        SAB TAB Ectopic Molar Multiple Live Births              # Outcome Date GA Lbr Jeremiah/2nd Weight Sex Delivery Anes PTL Lv   1 Current                   Ms. Velez is admitted with pregnancy at 38w1d for SPROM. Prenatal course was normal. Please see prenatal records for details. Past Medical History:   Diagnosis Date    Anemia     History of chicken pox     in high school    Migraine      History reviewed. No pertinent surgical history. Social History     Occupational History    Not on file. Social History Main Topics    Smoking status: Former Smoker     Packs/day: 0.50     Years: 10.00     Quit date: 3/18/2017    Smokeless tobacco: Never Used    Alcohol use No      Comment: none since + UCG    Drug use: No    Sexual activity: Yes     Partners: Male     Birth control/ protection: None      Comment: Pregnant     History reviewed. No pertinent family history. No Known Allergies  Prior to Admission medications    Medication Sig Start Date End Date Taking? Authorizing Provider   ferrous sulfate (IRON) 325 mg (65 mg iron) tablet Take 1 Tab by mouth three (3) times daily (with meals). 18   Jimmy Benedict MD   prenatal [id number] (ONE-A-DAY WOMEN'S PRENATAL 1) 28 mg iron- 800 mcg-235 mg cap Take  by mouth. Historical Provider        Review of Systems: Pertinent items are noted in HPI. Objective:     Vitals:  Vitals:    18 1245 18 1246 18 1253 18 1257   BP: 139/68 128/64 129/62 124/61   Pulse: (!) 112 99 97 90   Resp:       Temp: 98.2 °F (36.8 °C)           Physical Exam:  Patient with minimal distress.   Heart: Regular rate and rhythm  Lung: clear to auscultation throughout lung fields, no wheezes, no rales, no rhonchi and normal respiratory effort  Back: costovertebral angle tenderness absent  Abdomen: soft, nontender  Fundus: soft and non tender  Perineum: blood absent, amniotic fluid present  Cervical Exam: 4-5 cm dilated    70% effaced    -2 station    Lower Extremities:  - Edema 2+   - No evidence of DVT seen on physical exam.  Membranes:  leaking    Prenatal Labs:   Lab Results   Component Value Date/Time    ABO/Rh(D) A POSITIVE 05/17/2018 11:46 AM    Rubella, External Immune 10/24/2017    HBsAg, External Negative 10/24/2017    HIV, External Negative 10/24/2017    RPR, External Negative 10/24/2017    Gonorrhea, External Negative 11/13/2017    Chlamydia, External Negative 11/13/2017    ABO,Rh A Positive 10/24/2017         Assessment/Plan:     Principal Problem:    SPROM (prolonged spontaneous rupture of membranes) (5/17/2018)         Plan: Admit for Reassuring fetal status, Continue plan for vaginal delivery. Group B Strep was negative.     Signed By:  Yesi Silva MD     May 17, 2018

## 2018-05-17 NOTE — PROGRESS NOTES
crna at bs to redose epidural. Pericare/pad change. Pt repositioned to the left side.  Sve: 9cms/c/0

## 2018-05-17 NOTE — PROGRESS NOTES
Dr Héctor Medina updated to dilatation at 1800. Orders to recheck with shift change and start pushing if she is complete.

## 2018-05-17 NOTE — PROGRESS NOTES
sbar to Nicolas, [facility]. Pt assumed for care. C/c/+1. Will start pushing.

## 2018-05-17 NOTE — IP AVS SNAPSHOT
[id number] 
[phone] Patient: Maria Del Rosario Lincoln MRN: [id number] NNS:3[birth year] About your hospitalization You were admitted on:  May 17, 2018 You last received care in the:  [address] [facility] You were discharged on:  May 19, 2018 Why you were hospitalized Your primary diagnosis was:   (Spontaneous Vaginal Delivery) Your diagnoses also included:  Sprom (Prolonged Spontaneous Rupture Of Membranes), Normal Labor Follow-up Information Follow up With Details Comments Contact Info Chelsea Yusuf MD On 2018 at 3:30 pm [address]  [address] 
[phone] Teena Tello [facility]   [address] [facility] [address] 
[phone] Chelsea Yusuf MD Go on 2018 @ [address]  [address] 
[phone] Your Scheduled Appointments 2018  4:45 PM EDT Global Post Op with Chelsea Yusuf MD  
[facility]) [address]  [address]  
[phone] Discharge Orders None A check tawanda indicates which time of day the medication should be taken. My Medications START taking these medications Instructions Each Dose to Equal  
 Morning Noon Evening Bedtime HYDROcodone-acetaminophen 5-325 mg per tablet Commonly known as:  Marques Ortiz Your last dose was: Your next dose is: Take 1 Tab by mouth every four (4) hours as needed. Max Daily Amount: 6 Tabs. 1 Tab  
    
   
   
   
  
 ibuprofen 800 mg tablet Commonly known as:  MOTRIN Your last dose was: Your next dose is: Take 1 Tab by mouth every six (6) hours as needed. 800 mg CONTINUE taking these medications Instructions Each Dose to Equal  
 Morning Noon Evening Bedtime  
 ferrous sulfate 325 mg (65 mg iron) tablet Commonly known as:  Iron Your last dose was: Your next dose is: Take 1 Tab by mouth three (3) times daily (with meals). 325 mg  
    
   
   
   
  
 ONE-A-DAY WOMEN'S PRENATAL 1 28 mg iron- 800 mcg-235 mg Cap Generic drug:  prenatal [id number] Your last dose was: Your next dose is: Take  by mouth. Where to Get Your Medications These medications were sent to [address] [address] Hours:  24-hours Phone:  [phone]  
  ibuprofen 800 mg tablet Information on where to get these meds will be given to you by the nurse or doctor. ! Ask your nurse or doctor about these medications HYDROcodone-acetaminophen 5-325 mg per tablet Opioid Education Prescription Opioids: What You Need to Know: 
 
Prescription opioids can be used to help relieve moderate-to-severe pain and are often prescribed following a surgery or injury, or for certain health conditions. These medications can be an important part of treatment but also come with serious risks. Opioids are strong pain medicines. Examples include hydrocodone, oxycodone, fentanyl, and morphine. Heroin is an example of an illegal opioid. It is important to work with your health care provider to make sure you are getting the safest, most effective care. WHAT ARE THE RISKS AND SIDE EFFECTS OF OPIOID USE? Prescription opioids carry serious risks of addiction and overdose, especially with prolonged use. An opioid overdose, often marked by slow breathing, can cause sudden death. The use of prescription opioids can have a number of side effects as well, even when taken as directed. · Tolerance-meaning you might need to take more of a medication for the same pain relief · Physical dependence-meaning you have symptoms of withdrawal when the medication is stopped. Withdrawal symptoms can include nausea, sweating, chills, diarrhea, stomach cramps, and muscle aches. Withdrawal can last up to several weeks, depending on which drug you took and how long you took it. · Increased sensitivity to pain · Constipation · Nausea, vomiting, and dry mouth · Sleepiness and dizziness · Confusion · Depression · Low levels of testosterone that can result in lower sex drive, energy, and strength · Itching and sweating RISKS ARE GREATER WITH:      
· History of drug misuse, substance use disorder, or overdose · Mental health conditions (such as depression or anxiety) · Sleep apnea · Older age (72 years or older) · Pregnancy Avoid alcohol while taking prescription opioids. Also, unless specifically advised by your health care provider, medications to avoid include: · Benzodiazepines (such as Xanax or Valium) · Muscle relaxants (such as Soma or Flexeril) · Hypnotics (such as Ambien or Lunesta) · Other prescription opioids KNOW YOUR OPTIONS Talk to your health care provider about ways to manage your pain that don't involve prescription opioids. Some of these options may actually work better and have fewer risks and side effects. Options may include: 
· Pain relievers such as acetaminophen, ibuprofen, and naproxen · Some medications that are also used for depression or seizures · Physical therapy and exercise · Counseling to help patients learn how to cope better with triggers of pain and stress. · Application of heat or cold compress · Massage therapy · Relaxation techniques Be Informed Make sure you know the name of your medication, how much and how often to take it, and its potential risks & side effects. IF YOU ARE PRESCRIBED OPIOIDS FOR PAIN: 
· Never take opioids in greater amounts or more often than prescribed. Remember the goal is not to be pain-free but to manage your pain at a tolerable level. · Follow up with your primary care provider to: · Work together to create a plan on how to manage your pain. · Talk about ways to help manage your pain that don't involve prescription opioids. · Talk about any and all concerns and side effects. · Help prevent misuse and abuse. · Never sell or share prescription opioids · Help prevent misuse and abuse. · Store prescription opioids in a secure place and out of reach of others (this may include visitors, children, friends, and family). · Safely dispose of unused/unwanted prescription opioids: Find your community drug take-back program or your pharmacy mail-back program, or flush them down the toilet, following guidance from the Food and Drug Administration (www.fda.gov/Drugs/ResourcesForYou). · Visit www.cdc.gov/drugoverdose to learn about the risks of opioid abuse and overdose. · If you believe you may be struggling with addiction, tell your health care provider and ask for guidance or call [address] [place], [place] [address]Meiaoju at [phone]. Discharge Instructions Discharge instruction to follow: Activity: Pelvis rest for 6 weeks No heavy lifting over 15 lbs for 2 weeks No driving for 2 weeks No push/pull motion such as sweeping or vacuuming for 2 weeks No tub baths for 6 weeks Continue using the hygenique wand after each void or bowel movement. If using sitz bath continue until comfortable stopping. If using yaa-bottle continue to use until comfortable stopping. Change sanitary pad after each urination or bowel movement. Call MD for the following: 
    Fever over 101 F; pain not relieved by medication; foul smelling vaginal discharge or an increase in vaginal bleeding. Take medication as prescribed. Follow up with MD as order. After Your Delivery (the Postpartum Period): Care Instructions Your Care Instructions Congratulations on the birth of your baby. Like pregnancy, the  period can be a time of excitement, caleb, and exhaustion. You may look at your wondrous little baby and feel happy. You may also be overwhelmed by your new sleep hours and new responsibilities. At first, babies often sleep during the days and are awake at night. They do not have a pattern or routine. They may make sudden gasps, jerk themselves awake, or look like they have crossed eyes. These are all normal, and they may even make you smile. In these first weeks after delivery, try to take good care of yourself. It may take 4 to 6 weeks to feel like yourself again, and possibly longer if you had a  birth. You will likely feel very tired for several weeks. Your days will be full of ups and downs, but lots of caleb as well. Follow-up care is a key part of your treatment and safety. Be sure to make and go to all appointments, and call your doctor if you are having problems. It's also a good idea to know your test results and keep a list of the medicines you take. How can you care for yourself at home? Take care of your body after delivery · Use pads instead of tampons for the bloody flow that may last as long as 2 weeks. · Ease cramps with ibuprofen (Advil, Motrin). · Ease soreness of hemorrhoids and the area between your vagina and rectum with ice compresses or witch hazel pads. · Ease constipation by drinking lots of fluid and eating high-fiber foods. Ask your doctor about over-the-counter stool softeners. · Cleanse yourself with a gentle squeeze of warm water from a bottle instead of wiping with toilet paper. · Take a sitz bath in warm water several times a day. · Wear a good nursing bra. Ease sore and swollen breasts with warm, wet washcloths. · If you are not breastfeeding, use ice rather than heat for breast soreness. · Your period may not start for several months if you are breastfeeding. You may bleed more, and longer at first, than you did before you got pregnant. · Wait until you are healed (about 4 to 6 weeks) before you have sexual intercourse. Your doctor will tell you when it is okay to have sex. · Try not to travel with your baby for 5 or 6 weeks. If you take a long car trip, make frequent stops to walk around and stretch. Avoid exhaustion · Rest every day. Try to nap when your baby naps. · Ask another adult to be with you for a few days after delivery. · Plan for  if you have other children. · Stay flexible so you can eat at odd hours and sleep when you need to. Both you and your baby are making new schedules. · Plan small trips to get out of the house. Change can make you feel less tired. · Ask for help with housework, cooking, and shopping. Remind yourself that your job is to care for your baby. Know about help for postpartum depression · \"Baby blues\" are common for the first 1 to 2 weeks after birth. You may cry or feel sad or irritable for no reason. · Rest whenever you can. Being tired makes it harder to handle your emotions. · Go for walks with your baby. · Talk to your partner, friends, and family about your feelings. · If your symptoms last for more than a few weeks, or if you feel very depressed, ask your doctor for help. · Postpartum depression can be treated. Support groups and counseling can help. Sometimes medicine can also help. Stay healthy · Eat healthy foods so you have more energy, make good breast milk, and lose extra baby pounds. · If you breastfeed, avoid alcohol and drugs. Stay smoke-free. If you quit during pregnancy, congratulations. · Start daily exercise after 4 to 6 weeks, but rest when you feel tired. · Learn exercises to tone your belly. Do Kegel exercises to regain strength in your pelvic muscles. You can do these exercises while you stand or sit. ¨ Squeeze the same muscles you would use to stop your urine. Your belly and thighs should not move. ¨ Hold the squeeze for 3 seconds, and then relax for 3 seconds. ¨ Start with 3 seconds. Then add 1 second each week until you are able to squeeze for 10 seconds. ¨ Repeat the exercise 10 to 15 times for each session. Do three or more sessions each day. · Find a class for new mothers and new babies that has an exercise time. · If you had a  birth, give yourself a bit more time before you exercise, and be careful. When should you call for help? Call 911 anytime you think you may need emergency care. For example, call if: 
? · You passed out (lost consciousness). ?Call your doctor now or seek immediate medical care if: 
? · You have severe vaginal bleeding. This means you are passing blood clots and soaking through a pad each hour for 2 or more hours. ? · You are dizzy or lightheaded, or you feel like you may faint. ? · You have a fever. ? · You have new belly pain, or your pain gets worse. ? Watch closely for changes in your health, and be sure to contact your doctor if: 
? · Your vaginal bleeding seems to be getting heavier. ? · You have new or worse vaginal discharge. ? · You feel sad, anxious, or hopeless for more than a few days. ? · You do not get better as expected. Where can you learn more? Go to http://francheska-erasmo.info/. Enter A461 in the search box to learn more about \"After Your Delivery (the Postpartum Period): Care Instructions. \" Current as of: 2017 Content Version: 11.4 © 0661-6795 [employer]VivioLink. Care instructions adapted under license by [employer] (which disclaims liability or warranty for this information). If you have questions about a medical condition or this instruction, always ask your healthcare professional. [address] any warranty or liability for your use of this information. [facility] Announcement We are excited to announce that we are making your provider's discharge notes available to you in [facility]. You will see these notes when they are completed and signed by the physician that discharged you from your recent hospital stay. If you have any questions or concerns about any information you see in [facility], please call the Health Information Department where you were seen or reach out to your Primary Care Provider for more information about your plan of care. Introducing [facility]! Dear Good Doan: Thank you for requesting a [facility] account. Our records indicate that you already have an active [facility] account. You can access your account anytime at [email or web address] Did you know that you can access your hospital and ER discharge instructions at any time in [facility]? You can also review all of your test results from your hospital stay or ER visit. Additional Information If you have questions, please visit the Frequently Asked Questions section of the [facility] website at [email or web address] Remember, [facility] is NOT to be used for urgent needs. For medical emergencies, dial 911. Now available from your iPhone and Android! Introducing Nicolas Sun As a [facility] patient, I wanted to make you aware of our electronic visit tool called Nicolas Evenssatnam. [facility] 24/7 allows you to connect within minutes with a medical provider 24 hours a day, seven days a week via a mobile device or tablet or logging into a secure website from your computer. You can access Nicolas Sun from anywhere in the [place].  
 
A virtual visit might be right for you when you have a simple condition and feel like you just dont want to get out of bed, or cant get away from work for an appointment, when your regular [facility] provider is not available (evenings, weekends or holidays), or when youre out of town and need minor care. Electronic visits cost only $49 and if the [facility] 24/OggiFinogi provider determines a prescription is needed to treat your condition, one can be electronically transmitted to a nearby pharmacy*. Please take a moment to enroll today if you have not already done so. The enrollment process is free and takes just a few minutes. To enroll, please download the evly christine to your tablet or phone, or visit [email or web address] to enroll on your computer. And, as an [address] [place] [address] patient with a [employer] account, the results of your visits will be scanned into your electronic medical record and your primary care provider will be able to view the scanned results. We urge you to continue to see your regular [facility] provider for your ongoing medical care. And while your primary care provider may not be the one available when you seek a DanceOnbhavanifin virtual visit, the peace of mind you get from getting a real diagnosis real time can be priceless. For more information on [employer], view our Frequently Asked Questions (FAQs) at [email or web address]. Sincerely, 
 
Bro Dorado MD 
Chief Medical Officer [facility] Pia Connors *:  certain medications cannot be prescribed via Virobay Providers Seen During Your Hospitalization Provider Specialty Primary office phone Chelsea Yusuf MD Obstetrics & Gynecology [phone] Your Primary Care Physician (PCP) Primary Care Physician Office Phone Office Fax Eusebio Chapin [phone] You are allergic to the following No active allergies Recent Documentation Breastfeeding? OB Status Smoking Status Unknown Recent pregnancy Former Smoker Emergency Contacts Name Discharge Info Relation Home Work Mobile Esteban Beckman  Spouse [3] [phone] Patient Belongings The following personal items are in your possession at time of discharge: 
  Dental Appliances: None  Visual Aid: None      Home Medications: None   Jewelry: None  Clothing: At bedside    Other Valuables: At bedside, Cell Phone Please provide this summary of care documentation to your next provider. Signatures-by signing, you are acknowledging that this After Visit Summary has been reviewed with you and you have received a copy. Patient Signature:  ____________________________________________________________ Date:  ____________________________________________________________  
  
Anatoliy Cart Provider Signature:  ____________________________________________________________ Date:  ____________________________________________________________

## 2018-05-18 PROCEDURE — 65270000029 HC RM PRIVATE

## 2018-05-18 PROCEDURE — 74011250637 HC RX REV CODE- 250/637: Performed by: OBSTETRICS & GYNECOLOGY

## 2018-05-18 RX ORDER — HYDROCODONE BITARTRATE AND ACETAMINOPHEN 5; 325 MG/1; MG/1
1 TABLET ORAL
Qty: 30 TAB | Refills: 0 | Status: SHIPPED | OUTPATIENT
Start: 2018-05-18 | End: 2018-06-20

## 2018-05-18 RX ORDER — IBUPROFEN 800 MG/1
800 TABLET ORAL
Qty: 30 TAB | Refills: 1 | Status: SHIPPED | OUTPATIENT
Start: 2018-05-18 | End: 2018-06-20

## 2018-05-18 RX ADMIN — FERROUS SULFATE TAB 325 MG (65 MG ELEMENTAL FE) 325 MG: 325 (65 FE) TAB at 08:23

## 2018-05-18 RX ADMIN — IBUPROFEN 800 MG: 800 TABLET ORAL at 18:59

## 2018-05-18 RX ADMIN — IBUPROFEN 800 MG: 800 TABLET ORAL at 05:54

## 2018-05-18 RX ADMIN — PRENATAL VIT W/ FE FUMARATE-FA TAB 27-0.8 MG 1 TABLET: 27-0.8 TAB at 08:24

## 2018-05-18 RX ADMIN — IBUPROFEN 800 MG: 800 TABLET ORAL at 12:48

## 2018-05-18 RX ADMIN — FERROUS SULFATE TAB 325 MG (65 MG ELEMENTAL FE) 325 MG: 325 (65 FE) TAB at 12:48

## 2018-05-18 RX ADMIN — HYDROCODONE BITARTRATE AND ACETAMINOPHEN 1 TABLET: 5; 325 TABLET ORAL at 08:24

## 2018-05-18 RX ADMIN — WITCH HAZEL 1 PAD: 500 SOLUTION RECTAL; TOPICAL at 01:47

## 2018-05-18 RX ADMIN — HYDROCODONE BITARTRATE AND ACETAMINOPHEN 1 TABLET: 5; 325 TABLET ORAL at 02:53

## 2018-05-18 RX ADMIN — FERROUS SULFATE TAB 325 MG (65 MG ELEMENTAL FE) 325 MG: 325 (65 FE) TAB at 18:58

## 2018-05-18 RX ADMIN — Medication 1 SPRAY: at 01:47

## 2018-05-18 RX ADMIN — HYDROCODONE BITARTRATE AND ACETAMINOPHEN 1 TABLET: 5; 325 TABLET ORAL at 15:44

## 2018-05-18 NOTE — PROGRESS NOTES
Pt called out for assistance due to feeling some vaginal bleeding. Upon assessment pt noted to have small bleeding noted, when assessing fundus, firm but did firm up even more with massage/still midline at umbilicus, when pressing firmly on fundus no active bleeding noted at this time. Marquita care performed. Pt continues to have ice pack on perineum. Pt in bed with call light in reach.

## 2018-05-18 NOTE — PROGRESS NOTES
SBAR OUT Report: Mother    Verbal report given to Parag Lara RN (full name & credentials) on this patient, who is now being transferred to MIU (unit) for routine progression of care. The patient is not wearing a green \"Anesthesia-Duramorph\" band. Report consisted of patient's Situation, Background, Assessment and Recommendations (SBAR).  ID bands were compared with the identification form, and verified with the patient and receiving nurse. Information from the SBAR, Procedure Summary, Intake/Output, MAR and Recent Results and the [place] Report was reviewed with the receiving nurse; opportunity for questions and clarification provided. Fundal check with incoming RN.

## 2018-05-18 NOTE — PROGRESS NOTES
Pt given scheduled Iron PO and Prenatal PO. [place] 1 tab PO given to pt per request.  Educated pt to call out if pain medication does not help.

## 2018-05-18 NOTE — ANESTHESIA POSTPROCEDURE EVALUATION
Anesthesiology Epidural Followup Note    Dominic Dumont  24 y.o.  female      Visit Vitals    /61 (BP 1 Location: Right arm, BP Patient Position: At rest)    Pulse 87    Temp 36.4 °C (97.6 °F)    Resp 12    SpO2 97%    Breastfeeding Unknown       Pt is s/p vaginal delivery with continuous labor epidural. Patient had adequate pain control during labor and delivery, and she is currently without complaints. Motor and sensory function has returned to baseline in lower extremities. Back exam is clear with no signs of infection or erythema. No apparent anesthetic complications. Patient is satisfied with anesthetic care. Pain control and follow up per obstetrician.       Rock Maria Alejandra MD  Anesthesiologist  May 18, 2018

## 2018-05-18 NOTE — PROGRESS NOTES
0235-fundus firm/+2 to the right/ small bleeding. Will get up to bathroom to attempt to void. 0239-pt up to bathroom and able to void 480ml. Marquita care completed. Marquita pad changed and ice pack applied. 0250-pt back in bed with call light in reach. Fundus firm/ at umbilicus with scant bleeding.

## 2018-05-18 NOTE — PROGRESS NOTES
Pt denies urge to void at this time. Pt instructed to call before getting out of bed and to not attempt to get up without assistance. Pt voiced understanding and denies any questions. Pt in bed doing skin to skin with infant. Call light within reach.

## 2018-05-18 NOTE — PROGRESS NOTES
Chart reviewed - no needs identified.  met with family and provided education/pamphlet on [address] [facility] [place] [facility] Visit. Family would like to receive home visit. Referral will be made at discharge.  provided informational packet on  mood disorder education/resources. Family receptive to receiving information and denied any additional needs from . Family has this 's contact information should any needs/questions arise.     Elisa Ferrari, [address] [facility]

## 2018-05-18 NOTE — LACTATION NOTE

## 2018-05-18 NOTE — LACTATION NOTE
This note was copied from a baby's chart. Assisted with attempt at breast in football on L. No latch. Baby was very uninterested. No feeding effort, would not wake. Noted baby has been spitty. Started mom pumping. Got 15ml colostrum. Saved for later. Discussed baby needs to start eating by 24 hours. Discussed normal  behavior. May take baby a little while to figure out how to nurse well. Plan to continued trying at breast and pumping if no latch. Will follow output and weight loss. Will continue to assist with positioning and technique. Encouraged attempt at breast and follow plan.

## 2018-05-18 NOTE — PROGRESS NOTES
Report received from Lucina Barcenas RN care assumed. Pt in bed breast feeding infant with no complaints at this time. Call light within reach.

## 2018-05-18 NOTE — PROGRESS NOTES
Pt given scheduled Iron PO. Motrin 800 mg PO given to pt per request.  Educated pt to call out if pain medication does not help.

## 2018-05-18 NOTE — PROGRESS NOTES
Pt oriented to room and call light. Encouraged pt to increase fluid intake. [place] tray given to pt. Pt c/o vaginal soreness. Tucks, ice,  and Dermoplast applied to perineum. Pt resting in bed with call light in reach and FOB at bedside.

## 2018-05-18 NOTE — PROGRESS NOTES
Post-Partum Day Number 1 Progress Note    Patient doing well post-partum without significant complaint. Voiding withour difficulty, normal lochia. Vitals:  Patient Vitals for the past 8 hrs:   BP Temp Pulse Resp   18 0712 110/49 98.4 °F (36.9 °C) 77 18   18 0235 119/61 97.6 °F (36.4 °C) 87 12     Temp (24hrs), Av.4 °F (36.9 °C), Min:97.6 °F (36.4 °C), Max:99.6 °F (37.6 °C)      Vital signs stable, afebrile. Exam:  Patient without distress. Abdomen soft, fundus firm at level of umbilicus, nontender               Perineum with normal lochia noted. Lower extremities are negative for swelling, cords or tenderness. Assessment and Plan:  Patient appears to be having uncomplicated post-partum course. Continue routine perineal care and maternal education. Plan discharge tomorrow if no problems occur.

## 2018-05-18 NOTE — PROGRESS NOTES
Labor Progress Note  Patient seen, fetal heart rate and contraction pattern evaluated, patient examined.   Physical Exam:  Cervical Exam:  10 cm dilated    100% effaced    +2 station        Assessment/Plan:  Patient pushing with contractions  Reassuring fetal status, Continue plan for vaginal delivery

## 2018-05-18 NOTE — L&D DELIVERY NOTE
Delivery Summary    Patient: Yenny Paniagua MRN: [id number]  SSN: [id number]    YOB: 1990  Age: 24 y.o. Sex: female       Information for the patient's : Oneco Citlaly [[id number]]       Labor Events:    Labor: No   Rupture Date: 2018   Rupture Time: 1:35 PM   Rupture Type SROM   Amniotic Fluid Volume:      Amniotic Fluid Description: Blood stained None   Induction: None       Augmentation: Oxytocin   Labor Events:       Cervical Ripening:     None     Delivery Events:  Episiotomy: Midline   Laceration(s):       Repaired: Yes    Number of Repair Packets: 1   Suture Type and Size: Chromic 3-0     Estimated Blood Loss (ml): 400ml       Delivery Date: 2018    Delivery Time: 10:44 PM  Delivery Type: Vaginal, Spontaneous Delivery  Sex:  Female     Gestational Age: 43w4d   Delivery Clinician:  Jaclyn Reardon  Living Status: Living   Delivery Location: [place] [address]          APGARS  One minute Five minutes Ten minutes   Skin color: 1   1        Heart rate: 2   2        Grimace: 2   2        Muscle tone: 1   2        Breathin   2        Totals: 8   9            Presentation: Vertex    Position: Left Occiput Anterior  Resuscitation Method:  Suctioning-bulb; Tactile Stimulation     Meconium Stained: None      Cord Vessels: 3 Vessels      Cord Events:    Cord Blood Sent?:  Yes    Blood Gases Sent?:  Yes    Placenta:  Date/Time:  10:51 PM  Removal: Spontaneous      Appearance: Normal;Intact     [place] Measurements:  Birth Weight: 6 lb 15.1 oz (3.15 kg)      Birth Length: 52 cm      Head Circumference: 35 cm      Chest Circumference: 32 cm     Abdominal Girth: Other Providers:   JACLYN Wren;JAYNE MULLER;Yazmin GOMEZ SUSAN A;VERENICE SANTIAGO;Lola SMITH, Obstetrician;Primary Nurse;Primary [place] Nurse;Scrub Tech;Charge Nurse;Scrub Tech; Anesthesiologist           Group B Strep:   Lab Results   Component Value Date/Time    GrBStrep, External NEG 2018     Information for the patient's : Lara Sabianism [[id number]]       Lab Results   Component Value Date/Time    APH 7.249 2018 10:44 PM    APCO2 43 2018 10:44 PM    APO2 31 (H) 2018 10:44 PM    AHCO3 18 (L) 2018 10:44 PM    ABDC 8.6 (H) 2018 10:44 PM    SITE CORD 2018 10:44 PM    RSCOM NA at 2018 11 00 00 PM. Not read back.  2018 10:44 PM

## 2018-05-18 NOTE — PROGRESS NOTES
Pt up and ambulated to bathroom with minimal assistance. Pt unable to void at this time. Yaa care taught, yaa pad changed. Gown changed. Pt ambulated to wheelchair to be transported to MIU room.

## 2018-05-18 NOTE — PROGRESS NOTES
Shift assessment complete see flowsheet. Discussed tonight plan of care with pt. Pt voiced understanding and denies any questions. Pt sitting up in bed, skin to skin with baby. Instructed to call for assistance if she feels any gushes of blood. Pt voiced understanding and denies any questions. Pt oriented to call light and how to use. Pt in bed with call light in reach and FOB at bedside.

## 2018-05-18 NOTE — PROGRESS NOTES
Report received from Parag Lara RN. Patient care assumed-bedside reporting completed. Pt resting with  at bedside. Denies any needs.

## 2018-05-18 NOTE — PROGRESS NOTES
Birth of viable baby girl. APGARs 8/9. Nargis Teague, baby nurse at bedside for delivery. See RN's notes for infant assessment.

## 2018-05-19 VITALS
SYSTOLIC BLOOD PRESSURE: 119 MMHG | HEART RATE: 75 BPM | OXYGEN SATURATION: 97 % | DIASTOLIC BLOOD PRESSURE: 68 MMHG | TEMPERATURE: 97.3 F | RESPIRATION RATE: 18 BRPM

## 2018-05-19 PROBLEM — Z37.9 NORMAL LABOR: Status: ACTIVE | Noted: 2018-05-19

## 2018-05-19 PROCEDURE — 74011250637 HC RX REV CODE- 250/637: Performed by: OBSTETRICS & GYNECOLOGY

## 2018-05-19 RX ORDER — DEXTROSE, SODIUM CHLORIDE, SODIUM LACTATE, POTASSIUM CHLORIDE, AND CALCIUM CHLORIDE 5; .6; .31; .03; .02 G/100ML; G/100ML; G/100ML; G/100ML; G/100ML
125 INJECTION, SOLUTION INTRAVENOUS CONTINUOUS
Status: DISCONTINUED | OUTPATIENT
Start: 2018-05-19 | End: 2018-05-19

## 2018-05-19 RX ORDER — OXYTOCIN/RINGER'S LACTATE 15/250 ML
250 PLASTIC BAG, INJECTION (ML) INTRAVENOUS ONCE
Status: DISCONTINUED | OUTPATIENT
Start: 2018-05-19 | End: 2018-05-19

## 2018-05-19 RX ADMIN — IBUPROFEN 800 MG: 800 TABLET ORAL at 06:31

## 2018-05-19 RX ADMIN — HYDROCODONE BITARTRATE AND ACETAMINOPHEN 1 TABLET: 5; 325 TABLET ORAL at 11:35

## 2018-05-19 RX ADMIN — HYDROCODONE BITARTRATE AND ACETAMINOPHEN 1 TABLET: 5; 325 TABLET ORAL at 02:00

## 2018-05-19 NOTE — LACTATION NOTE
Mom and baby are going home today. Continue to offer the breast without restriction. Mom's milk should be fully in over the next few days. Reviewed engorgement precautions. Hand Expression has been demoed and written hand-out reviewed. As milk comes in baby will be more alert at the breast and swallows will be more obvious. Breasts may feel softer once baby has finished nursing. Baby should be back to birth weight by 3weeks of age. And then gain on average 1 oz per day for the next 2-3 months. Reviewed babies should be exclusively breastfeeding for the first 6 months and that breastfeeding should continue after introduction of appropriate complimentary foods after 6 months. Initial output should be at least 1 wet and 1 bowel movement for each day old baby is. By day 5-7 once milk is fully in baby will consistently have 6 or more soaking wet diapers and about 4 bowel movement. Some babies have a bowel movement with every feeding and some have 1-3 large bowel movements each day. Inadequate output may indicate inadequate feedings and should be reported to your Pediatrician. Bowel habits may change as baby gets older. Encouraged follow-up at Pediatrician in 1-2 days, no later than 1 week of age. Call [facility] for any questions as needed or to set up an OP visit. OP phone calls are returned within 24 hours. Community Breastfeeding Resource List given.

## 2018-05-19 NOTE — LACTATION NOTE
In to review discharge information with mom. Also gave mom a feeding plan and reviewed that as well. Answered questions. Mom continues to offer the breast, pump and feed expressed colostrum to infant using the curve tip syringe. Mom is also taking some formula to use as supplement if her volume does not pick back up. She is presently pumping drops. Mom and infant are following up with [facility] and will see lactation consultant there.

## 2018-05-19 NOTE — LACTATION NOTE
This note was copied from a baby's chart. Individualized Feeding Plan for Breastfeeding   Lactation Services [phone]  As much as possible, hold your baby on your chest so babys bare skin is against your bare skin with a blanket covering babys back, especially 30 minutes before it is time for baby to eat. Watch for early feeding cues such as, licking lips, sucking motions, rooting, hands to mouth. Crying is a late feeding cue. Feed your baby at least 8 times in 24 hours, or more if your baby is showing feeding cues. If baby is sleepy put baby skin to skin and watch for hunger cues. To rouse baby: unwrap, undress, massage hands, feet, & back, change diaper, gently change babys position from lying to sitting. 15-20 minutes on the first breast of active breastfeeding is considered a good feeding. Good, active breastfeeding is when baby is alert, tugging the nipple, their ear may move, and you can hear swallows. Allow baby to finish the first side before changing sides. Sleeping at the breast or only brief, light sucks should not be considered a good, full breastfeed. At each feeding:  __x__1. Do Suck Practice on finger before each feeding until sucking pattern is smooth. Try using index finger. Nail down towards tongue. __x__2. Hand Express for a few minutes prior to latching to help start milk flow. __x__3. Baby needs to NURSE WELL x 15-20 minutes on at least first breast, burp and offer 2nd breast at every feeding. If no sustained latch only attempt at breast for 10 minutes. If baby does not latch on and feed well on at least one side, you should:   __x__4. Double pump for 15 minutes with breast massage and compression. Hand express for an additional 2-3 minutes per side. Pump after each feeding attempt or poor feeding, up to 8 times per day. If you are not putting baby to the breast you need to pump 8 times a day. Pump every at least every 3 hours. __x__5. Give baby all of the breast milk you obtain using a straight syringe or  curved syringe. If baby does NOT have enough wet and dirty diapers per day, is jaundiced/lethargic, or has significant weight loss AND you do NOT pump enough milk for each feeding (per volume listed below), formula supplementation may need to be used. Call lactation department /pediatrician if you have concerns. AVERAGE INTAKES OF COLOSTRUM BY HEALTHY  INFANTS:  Time  Day Intake (ml/feed)  Based on 8 feedings per day. 1st 24 hrs  1 2-10 ml  24-48 hrs  2 5-15 ml  48-72 hrs  3 15-30 ml (0.5-1 oz)  72-96 hrs  4 30-45 ml (1-1.5oz)                          5-6      45-60 ml (1.5-2oz)                           7         60-75 ml (2-2.5oz)    By day 7, baby will need 69 ml or 2 oz at each feeding based on 8 feedings per day & babys weight. (1oz = 30ml). Total milk volume needed in 24 hours by Day 7 is 18.5 oz per day based on baby's birthweight of 6lb 15 oz. Use feeding plan until follow up with pediatrician. Continue to attempt at the breast for most feeds. Pump every 3 hours if no latch. Give all pumped colostrum/breastmilk at each feeding. Mom and infant for following up with [facility] and will see lactation consultant there. Outpatient services are located on the 4th floor at [facility]. Check in at the 4th floor registration desk (the same one you used when you came to have your baby).   Call for questions [phone]

## 2018-05-19 NOTE — PROGRESS NOTES
After Norco was removed from package, patient decided that she did not need to take it at this time. Medication wasted and witnessed by Ricki Smith RN. See Pyxis report.

## 2018-05-19 NOTE — PROGRESS NOTES
Post-Partum Day Number 2 Progress/Discharge Note    Patient doing well post-partum without significant complaint. Voiding without difficulty, normal lochia, positive flatus. Vitals:  Patient Vitals for the past 8 hrs:   BP Temp Pulse Resp   18 0751 119/68 97.3 °F (36.3 °C) 75 18     Temp (24hrs), Av.7 °F (36.5 °C), Min:97.3 °F (36.3 °C), Max:98.1 °F (36.7 °C)      Vital signs stable, afebrile. Exam:  Patient without distress. Abdomen soft, fundus firm below level of umbilicus, non tender               Perineum with normal lochia noted. Lower extremities are negative for swelling, cords or tenderness. Lab/Data Review:      Assessment and Plan:  Patient appears to be having uncomplicated post-partum course. Continue routine perineal care and maternal education. Plan discharge for today with follow up in our office in 6 weeks.

## 2018-05-19 NOTE — DISCHARGE SUMMARY
Obstetrical Discharge Summary     Name: Mony Montague MRN: [id number]  SSN: [id number]    YOB: 1990  Age: 24 y.o. Sex: female      Allergies: Review of patient's allergies indicates no known allergies. Admit Date: 2018    Discharge Date: 2018     Admitting Physician: Arlander Fothergill, MD     Attending Physician:  Arlander Fothergill, MD     * Admission Diagnoses: direct admit;SPROM (prolonged spontaneous rupture of membra*    * Discharge Diagnoses:   Information for the patient's : Benita Cruz [[id number]]   Delivery of a 3.15 kg female infant via Vaginal, Spontaneous Delivery on 2018 at 10:44 PM  by . Apgars were 8 and 9. Additional Diagnoses:   Hospital Problems as of 2018  Date Reviewed: 2018          Codes Class Noted - Resolved POA    * (Principal) (spontaneous vaginal delivery) ICD-10-CM: O80  ICD-9-CM: 650  2018 - Present No        RESOLVED: SPROM (prolonged spontaneous rupture of membranes) ICD-10-CM: O42.90  ICD-9-CM: 658.20  2018 - 2018 Yes             Lab Results   Component Value Date/Time    ABO/Rh(D) A POSITIVE 2018 11:46 AM    Rubella, External Immune 10/24/2017    GrBStrep, External NEG 2018    ABO,Rh A Positive 10/24/2017      There is no immunization history on file for this patient.     * Procedures:   Spontaneous vaginal delivery      [place]  Depression Scale  I have been able to laugh and see the funny side of things: As much as I always could  I have looked forward with enjoyment to things: As much as I ever did  I have blamed myself unnecessarily when things went wrong: Yes, some of the time  I have been anxious or worried for no good reason: Yes, sometimes  I have felt scared or panicky for no very good reason: Yes, sometimes  Things have been getting on top of me: No, most of the time I have coped quite well  I have been so unhappy that I have had difficulty sleeping: No, not at all  I have felt sad or miserable: No, not at all  I have been so unhappy that I have been crying: Only occasionally  The thought of harming myself has occurred to me: Never  Total Score: 8    * Discharge Condition: stable    * Hospital Course: Normal hospital course following the delivery. * Disposition: Home    Discharge Medications:   Current Discharge Medication List      START taking these medications    Details   HYDROcodone-acetaminophen (NORCO) 5-325 mg per tablet Take 1 Tab by mouth every four (4) hours as needed. Max Daily Amount: 6 Tabs. Qty: 30 Tab, Refills: 0    Associated Diagnoses:  (spontaneous vaginal delivery)      ibuprofen (MOTRIN) 800 mg tablet Take 1 Tab by mouth every six (6) hours as needed. Qty: 30 Tab, Refills: 1    Associated Diagnoses:  (spontaneous vaginal delivery)         CONTINUE these medications which have NOT CHANGED    Details   ferrous sulfate (IRON) 325 mg (65 mg iron) tablet Take 1 Tab by mouth three (3) times daily (with meals). Qty: 90 Tab, Refills: 4    Associated Diagnoses: Anemia affecting pregnancy in second trimester      prenatal 123-iron-folic-omeg3s (ONE-A-DAY WOMEN'S PRENATAL 1) 28 mg iron- 800 mcg-235 mg cap Take  by mouth. * Follow-up Care/Patient Instructions:   Activity: No sex for 6 weeks  Diet: Regular Diet  Wound Care: None needed    Follow-up Information     Follow up With Details Comments [address], [place] On 2018 at 3:30 pm [address] [place] [id number]  [address] [address] [address]  [phone]             Signed By:  Piper Castano MD     May 19, 2018

## 2018-05-19 NOTE — DISCHARGE INSTRUCTIONS
Discharge instruction to follow: Activity: Pelvis rest for 6 weeks     No heavy lifting over 15 lbs for 2 weeks     No driving for 2 weeks     No push/pull motion such as sweeping or vacuuming for 2 weeks     No tub baths for 6 weeks    Continue using the hygenique wand after each void or bowel movement. If using sitz bath continue until comfortable stopping. If using yaa-bottle continue to use until comfortable stopping. Change sanitary pad after each urination or bowel movement. Call MD for the following:      Fever over 101 F; pain not relieved by medication; foul smelling vaginal discharge or an increase in vaginal bleeding. Take medication as prescribed. Follow up with MD as order. After Your Delivery (the Postpartum Period): Care Instructions  Your Care Instructions    Congratulations on the birth of your baby. Like pregnancy, the  period can be a time of excitement, caleb, and exhaustion. You may look at your wondrous little baby and feel happy. You may also be overwhelmed by your new sleep hours and new responsibilities. At first, babies often sleep during the days and are awake at night. They do not have a pattern or routine. They may make sudden gasps, jerk themselves awake, or look like they have crossed eyes. These are all normal, and they may even make you smile. In these first weeks after delivery, try to take good care of yourself. It may take 4 to 6 weeks to feel like yourself again, and possibly longer if you had a  birth. You will likely feel very tired for several weeks. Your days will be full of ups and downs, but lots of caleb as well. Follow-up care is a key part of your treatment and safety. Be sure to make and go to all appointments, and call your doctor if you are having problems. It's also a good idea to know your test results and keep a list of the medicines you take. How can you care for yourself at home?   Take care of your body after delivery  · Use pads instead of tampons for the bloody flow that may last as long as 2 weeks. · Ease cramps with ibuprofen (Advil, Motrin). · Ease soreness of hemorrhoids and the area between your vagina and rectum with ice compresses or witch hazel pads. · Ease constipation by drinking lots of fluid and eating high-fiber foods. Ask your doctor about over-the-counter stool softeners. · Cleanse yourself with a gentle squeeze of warm water from a bottle instead of wiping with toilet paper. · Take a sitz bath in warm water several times a day. · Wear a good nursing bra. Ease sore and swollen breasts with warm, wet washcloths. · If you are not breastfeeding, use ice rather than heat for breast soreness. · Your period may not start for several months if you are breastfeeding. You may bleed more, and longer at first, than you did before you got pregnant. · Wait until you are healed (about 4 to 6 weeks) before you have sexual intercourse. Your doctor will tell you when it is okay to have sex. · Try not to travel with your baby for 5 or 6 weeks. If you take a long car trip, make frequent stops to walk around and stretch. Avoid exhaustion  · Rest every day. Try to nap when your baby naps. · Ask another adult to be with you for a few days after delivery. · Plan for  if you have other children. · Stay flexible so you can eat at odd hours and sleep when you need to. Both you and your baby are making new schedules. · Plan small trips to get out of the house. Change can make you feel less tired. · Ask for help with housework, cooking, and shopping. Remind yourself that your job is to care for your baby. Know about help for postpartum depression  · \"Baby blues\" are common for the first 1 to 2 weeks after birth. You may cry or feel sad or irritable for no reason. · Rest whenever you can. Being tired makes it harder to handle your emotions. · Go for walks with your baby.   · Talk to your partner, friends, and family about your feelings. · If your symptoms last for more than a few weeks, or if you feel very depressed, ask your doctor for help. · Postpartum depression can be treated. Support groups and counseling can help. Sometimes medicine can also help. Stay healthy  · Eat healthy foods so you have more energy, make good breast milk, and lose extra baby pounds. · If you breastfeed, avoid alcohol and drugs. Stay smoke-free. If you quit during pregnancy, congratulations. · Start daily exercise after 4 to 6 weeks, but rest when you feel tired. · Learn exercises to tone your belly. Do Kegel exercises to regain strength in your pelvic muscles. You can do these exercises while you stand or sit. ¨ Squeeze the same muscles you would use to stop your urine. Your belly and thighs should not move. ¨ Hold the squeeze for 3 seconds, and then relax for 3 seconds. ¨ Start with 3 seconds. Then add 1 second each week until you are able to squeeze for 10 seconds. ¨ Repeat the exercise 10 to 15 times for each session. Do three or more sessions each day. · Find a class for new mothers and new babies that has an exercise time. · If you had a  birth, give yourself a bit more time before you exercise, and be careful. When should you call for help? Call 911 anytime you think you may need emergency care. For example, call if:  ? · You passed out (lost consciousness). ?Call your doctor now or seek immediate medical care if:  ? · You have severe vaginal bleeding. This means you are passing blood clots and soaking through a pad each hour for 2 or more hours. ? · You are dizzy or lightheaded, or you feel like you may faint. ? · You have a fever. ? · You have new belly pain, or your pain gets worse. ? Watch closely for changes in your health, and be sure to contact your doctor if:  ? · Your vaginal bleeding seems to be getting heavier. ? · You have new or worse vaginal discharge.    ? · You feel sad, anxious, or hopeless for more than a few days. ? · You do not get better as expected. Where can you learn more? Go to http://francheska-erasmo.info/. Enter A461 in the search box to learn more about \"After Your Delivery (the Postpartum Period): Care Instructions. \"  Current as of: March 16, 2017  Content Version: 11.4  © 0099-9596 [employer]. Care instructions adapted under license by [employer] (which disclaims liability or warranty for this information). If you have questions about a medical condition or this instruction, always ask your healthcare professional. [address] any warranty or liability for your use of this information.

## 2018-05-21 NOTE — PROGRESS NOTES
Referral made to [employer] [employer]  home visit program.    Jaqui Pride, [address] [facility]

## 2018-06-20 PROBLEM — Z37.9 NORMAL LABOR: Status: RESOLVED | Noted: 2018-05-19 | Resolved: 2018-06-20

## 2022-03-19 PROBLEM — D25.1 INTRAMURAL LEIOMYOMA OF UTERUS: Status: ACTIVE | Noted: 2018-01-10

## 2022-10-22 ENCOUNTER — HOSPITAL ENCOUNTER (EMERGENCY)
Age: 32
Discharge: HOME | End: 2022-10-22
Attending: EMERGENCY MEDICINE
Payer: COMMERCIAL

## 2022-10-22 VITALS
RESPIRATION RATE: 16 BRPM | HEART RATE: 65 BPM | SYSTOLIC BLOOD PRESSURE: 123 MMHG | DIASTOLIC BLOOD PRESSURE: 78 MMHG | TEMPERATURE: 98.8 F | WEIGHT: 148 LBS | OXYGEN SATURATION: 100 % | BODY MASS INDEX: 24.66 KG/M2 | HEIGHT: 65 IN

## 2022-10-22 DIAGNOSIS — R51.9 NONINTRACTABLE EPISODIC HEADACHE, UNSPECIFIED HEADACHE TYPE: Primary | ICD-10-CM

## 2022-10-22 LAB
ALBUMIN SERPL-MCNC: 4.4 G/DL (ref 3.5–5)
ALBUMIN/GLOB SERPL: 1.5 {RATIO} (ref 0.4–1.6)
ALP SERPL-CCNC: 56 U/L (ref 45–117)
ALT SERPL-CCNC: 12 U/L (ref 13–61)
ANION GAP SERPL CALC-SCNC: 9 MMOL/L (ref 2–11)
APPEARANCE UR: CLEAR
AST SERPL-CCNC: 22 U/L (ref 15–37)
BASOPHILS # BLD: 0.1 K/UL (ref 0–0.2)
BASOPHILS NFR BLD: 1 % (ref 0–2)
BILIRUB SERPL-MCNC: 0.2 MG/DL (ref 0.2–1.1)
BILIRUB UR QL: NEGATIVE
BUN SERPL-MCNC: 8 MG/DL (ref 7–18)
CALCIUM SERPL-MCNC: 9.3 MG/DL (ref 8.3–10.4)
CHLORIDE SERPL-SCNC: 105 MMOL/L (ref 98–107)
CO2 SERPL-SCNC: 27 MMOL/L (ref 21–32)
COLOR UR: YELLOW
CREAT SERPL-MCNC: 0.62 MG/DL (ref 0.6–1)
DIFFERENTIAL METHOD BLD: NORMAL
EOSINOPHIL # BLD: 0.4 K/UL (ref 0–0.8)
EOSINOPHIL NFR BLD: 5 % (ref 0.5–7.8)
ERYTHROCYTE [DISTWIDTH] IN BLOOD BY AUTOMATED COUNT: 12.6 % (ref 11.9–14.6)
GLOBULIN SER CALC-MCNC: 2.9 G/DL (ref 2.8–4.5)
GLUCOSE SERPL-MCNC: 102 MG/DL (ref 65–100)
GLUCOSE UR STRIP.AUTO-MCNC: NEGATIVE MG/DL
HCG UR QL: NEGATIVE
HCT VFR BLD AUTO: 40.5 % (ref 35.8–46.3)
HGB BLD-MCNC: 13.5 G/DL (ref 11.7–15.4)
HGB UR QL STRIP: NEGATIVE
IMM GRANULOCYTES # BLD AUTO: 0 K/UL (ref 0–0.5)
IMM GRANULOCYTES NFR BLD AUTO: 0 % (ref 0–5)
KETONES UR QL STRIP.AUTO: NEGATIVE MG/DL
LEUKOCYTE ESTERASE UR QL STRIP.AUTO: NEGATIVE
LYMPHOCYTES # BLD: 2.3 K/UL (ref 0.5–4.6)
LYMPHOCYTES NFR BLD: 31 % (ref 13–44)
MCH RBC QN AUTO: 31.1 PG (ref 26.1–32.9)
MCHC RBC AUTO-ENTMCNC: 33.3 G/DL (ref 31.4–35)
MCV RBC AUTO: 93.3 FL (ref 82–102)
MONOCYTES # BLD: 0.5 K/UL (ref 0.1–1.3)
MONOCYTES NFR BLD: 7 % (ref 4–12)
NEUTS SEG # BLD: 4.2 K/UL (ref 1.7–8.2)
NEUTS SEG NFR BLD: 56 % (ref 43–78)
NITRITE UR QL STRIP.AUTO: NEGATIVE
NRBC # BLD: 0 K/UL (ref 0–0.2)
PH UR STRIP: 6 [PH] (ref 5–9)
PLATELET # BLD AUTO: 248 K/UL (ref 150–450)
PMV BLD AUTO: 9.8 FL (ref 9.4–12.3)
POTASSIUM SERPL-SCNC: 3.7 MMOL/L (ref 3.5–5.1)
PROT SERPL-MCNC: 7.3 G/DL (ref 6.4–8.2)
PROT UR STRIP-MCNC: NEGATIVE MG/DL
RBC # BLD AUTO: 4.34 M/UL (ref 4.05–5.2)
SODIUM SERPL-SCNC: 141 MMOL/L (ref 133–143)
SP GR UR REFRACTOMETRY: 1.01 (ref 1–1.02)
UROBILINOGEN UR QL STRIP.AUTO: 0.2 EU/DL (ref 0.2–1)
WBC # BLD AUTO: 7.5 K/UL (ref 4.3–11.1)

## 2022-10-22 PROCEDURE — 6360000002 HC RX W HCPCS: Performed by: EMERGENCY MEDICINE

## 2022-10-22 PROCEDURE — 96374 THER/PROPH/DIAG INJ IV PUSH: CPT

## 2022-10-22 PROCEDURE — 96375 TX/PRO/DX INJ NEW DRUG ADDON: CPT

## 2022-10-22 PROCEDURE — 99284 EMERGENCY DEPT VISIT MOD MDM: CPT

## 2022-10-22 PROCEDURE — 85025 COMPLETE CBC W/AUTO DIFF WBC: CPT

## 2022-10-22 PROCEDURE — 81025 URINE PREGNANCY TEST: CPT

## 2022-10-22 PROCEDURE — 81003 URINALYSIS AUTO W/O SCOPE: CPT

## 2022-10-22 PROCEDURE — 80053 COMPREHEN METABOLIC PANEL: CPT

## 2022-10-22 PROCEDURE — 2580000003 HC RX 258: Performed by: EMERGENCY MEDICINE

## 2022-10-22 RX ORDER — 0.9 % SODIUM CHLORIDE 0.9 %
1000 INTRAVENOUS SOLUTION INTRAVENOUS
Status: COMPLETED | OUTPATIENT
Start: 2022-10-22 | End: 2022-10-22

## 2022-10-22 RX ORDER — METOCLOPRAMIDE 5 MG/1
5 TABLET ORAL 3 TIMES DAILY PRN
Qty: 30 TABLET | Refills: 0 | Status: SHIPPED | OUTPATIENT
Start: 2022-10-22

## 2022-10-22 RX ORDER — KETOROLAC TROMETHAMINE 30 MG/ML
30 INJECTION, SOLUTION INTRAMUSCULAR; INTRAVENOUS
Status: COMPLETED | OUTPATIENT
Start: 2022-10-22 | End: 2022-10-22

## 2022-10-22 RX ORDER — DIPHENHYDRAMINE HYDROCHLORIDE 50 MG/ML
25 INJECTION INTRAMUSCULAR; INTRAVENOUS
Status: COMPLETED | OUTPATIENT
Start: 2022-10-22 | End: 2022-10-22

## 2022-10-22 RX ORDER — METOCLOPRAMIDE HYDROCHLORIDE 5 MG/ML
10 INJECTION INTRAMUSCULAR; INTRAVENOUS
Status: COMPLETED | OUTPATIENT
Start: 2022-10-22 | End: 2022-10-22

## 2022-10-22 RX ADMIN — DIPHENHYDRAMINE HYDROCHLORIDE 25 MG: 50 INJECTION, SOLUTION INTRAMUSCULAR; INTRAVENOUS at 08:59

## 2022-10-22 RX ADMIN — SODIUM CHLORIDE 1000 ML: 9 INJECTION, SOLUTION INTRAVENOUS at 09:00

## 2022-10-22 RX ADMIN — KETOROLAC TROMETHAMINE 30 MG: 30 INJECTION, SOLUTION INTRAMUSCULAR at 08:59

## 2022-10-22 RX ADMIN — METOCLOPRAMIDE 10 MG: 5 INJECTION, SOLUTION INTRAMUSCULAR; INTRAVENOUS at 08:59

## 2022-10-22 ASSESSMENT — PAIN DESCRIPTION - LOCATION
LOCATION: HEAD
LOCATION: HEAD

## 2022-10-22 ASSESSMENT — PAIN SCALES - GENERAL
PAINLEVEL_OUTOF10: 6
PAINLEVEL_OUTOF10: 7

## 2022-10-22 ASSESSMENT — PAIN DESCRIPTION - ORIENTATION
ORIENTATION: ANTERIOR
ORIENTATION: LEFT;ANTERIOR

## 2022-10-22 ASSESSMENT — PAIN DESCRIPTION - DESCRIPTORS: DESCRIPTORS: ACHING

## 2022-10-22 ASSESSMENT — PAIN - FUNCTIONAL ASSESSMENT: PAIN_FUNCTIONAL_ASSESSMENT: 0-10

## 2022-10-22 NOTE — DISCHARGE INSTRUCTIONS
Take the Reglan as needed for nausea or for headaches up to 3 times a day. Do the exercises to relieve muscle tension in the neck. Try to schedule an outpatient follow-up appointment with neurology. You may need a referral from your family doctor for this.

## 2022-10-22 NOTE — ED TRIAGE NOTES
Pt presents to the ED with c/o migraine. Pt states she has had a migraine off and on since Thursday. Pt states she has hx of migraines and takes magnesium supplements for them. Pt denies any cough, fever, congestion.

## 2022-10-22 NOTE — ED PROVIDER NOTES
Emergency Department Provider Note                   PCP:                Md Adrianna Prieto               Age: 29 y.o. Sex: female       ICD-10-CM    1. Nonintractable episodic headache, unspecified headache type  R51.9           DISPOSITION Decision To Discharge 10/22/2022 10:06:37 AM       MDM  Number of Diagnoses or Management Options  Nonintractable episodic headache, unspecified headache type  Diagnosis management comments: tension headache, migraine headache, brain tumor, cluster headache, sinusitis             Amount and/or Complexity of Data Reviewed  Clinical lab tests: ordered and reviewed  Tests in the medicine section of CPT®: ordered and reviewed  Decide to obtain previous medical records or to obtain history from someone other than the patient: yes         ED Course as of 11/23/22 0357   Sat Oct 22, 2022   1006 Patient is feeling better. She states that her headache is down to a 2 out of 10. The patient and I talked about using Reglan at home as needed for nausea and headaches. We also talked about doing exercises for muscle tension in the upper trapezius region.  [KH]      ED Course User Index  [KH] Davida Bonilla DO        Orders Placed This Encounter   Procedures    CBC with Auto Differential    Comprehensive Metabolic Panel    Pregnancy, Urine    Urinalysis w rflx microscopic        Medications   0.9 % sodium chloride bolus (0 mLs IntraVENous Stopped 10/22/22 1009)   ketorolac (TORADOL) injection 30 mg (30 mg IntraVENous Given 10/22/22 0859)   metoclopramide (REGLAN) injection 10 mg (10 mg IntraVENous Given 10/22/22 0859)   diphenhydrAMINE (BENADRYL) injection 25 mg (25 mg IntraVENous Given 10/22/22 0859)       Discharge Medication List as of 10/22/2022 10:09 AM        START taking these medications    Details   metoclopramide (REGLAN) 5 MG tablet Take 1 tablet by mouth 3 times daily as needed (nausea/vomiting), Disp-30 tablet, R-0Print              Shama Zambrano is a 29 y.o. female who presents to the Emergency Department with chief complaint of    Chief Complaint   Patient presents with    Migraine      30 y/o female with a hx of HA presenting to the ER secondary to intermittent HA since Thursday. Normally her magnesium powder controls her symptoms. No focal neurologic complaints. All other systems reviewed and are negative unless otherwise stated in the history of present illness section. Review of Systems   Neurological:  Positive for headaches. All other systems reviewed and are negative. Past Medical History:   Diagnosis Date    Migraine         History reviewed. No pertinent surgical history. History reviewed. No pertinent family history. Social History     Socioeconomic History    Marital status:      Spouse name: None    Number of children: None    Years of education: None    Highest education level: None   Tobacco Use    Smoking status: Never    Smokeless tobacco: Never   Substance and Sexual Activity    Drug use: Never        Allergies: Patient has no known allergies. Discharge Medication List as of 10/22/2022 10:09 AM           Vitals signs and nursing note reviewed. No data found. Physical Exam     GENERAL:The patient has Body mass index is 24.63 kg/m². Well-hydrated. VITAL SIGNS: Heart rate, blood pressure, respiratory rate reviewed as recorded in  nurse's notes  EYES: Pupils reactive. Extraocular motion intact. No conjunctival redness or drainage. + red reflex. No papilledema noted  NOSE: No nasal drainage or epistaxis. MOUTH/THROAT: Pharynx clear; airway patent. NECK: Supple, no meningeal signs. Trachea midline. No masses or thyromegaly. LUNGS: Breath sounds clear and equal bilaterally no accessory muscle use. CHEST: No deformity  CARDIOVASCULAR: Regular rate and rhythm  EXTREMITIES: No clubbing or cyanosis. No joint swelling. Normal muscle tone. No  restricted range of motion appreciated. NEUROLOGIC: Sensation is grossly intact. Cranial nerve exam reveals face is  symmetrical, tongue is midline speech is clear. SKIN: No rash or petechiae. Good skin turgor palpated. PSYCHIATRIC: Alert and oriented. Appropriate behavior and judgment.       Procedures        Results for orders placed or performed during the hospital encounter of 10/22/22   CBC with Auto Differential   Result Value Ref Range    WBC 7.5 4.3 - 11.1 K/uL    RBC 4.34 4.05 - 5.20 M/uL    Hemoglobin 13.5 11.7 - 15.4 g/dL    Hematocrit 40.5 35.8 - 46.3 %    MCV 93.3 82.0 - 102.0 FL    MCH 31.1 26.1 - 32.9 PG    MCHC 33.3 31.4 - 35.0 g/dL    RDW 12.6 11.9 - 14.6 %    Platelets 732 907 - 253 K/uL    MPV 9.8 9.4 - 12.3 FL    nRBC 0.00 0.0 - 0.2 K/uL    Differential Type AUTOMATED      Seg Neutrophils 56 43 - 78 %    Lymphocytes 31 13 - 44 %    Monocytes 7 4.0 - 12.0 %    Eosinophils % 5 0.5 - 7.8 %    Basophils 1 0.0 - 2.0 %    Immature Granulocytes 0 0.0 - 5.0 %    Segs Absolute 4.2 1.7 - 8.2 K/UL    Absolute Lymph # 2.3 0.5 - 4.6 K/UL    Absolute Mono # 0.5 0.1 - 1.3 K/UL    Absolute Eos # 0.4 0.0 - 0.8 K/UL    Basophils Absolute 0.1 0.0 - 0.2 K/UL    Absolute Immature Granulocyte 0.0 0.0 - 0.5 K/UL   Comprehensive Metabolic Panel   Result Value Ref Range    Sodium 141 133 - 143 mmol/L    Potassium 3.7 3.5 - 5.1 mmol/L    Chloride 105 98 - 107 mmol/L    CO2 27 21 - 32 mmol/L    Anion Gap 9 2 - 11 mmol/L    Glucose 102 (H) 65 - 100 mg/dL    BUN 8 7.0 - 18.0 MG/DL    Creatinine 0.62 0.6 - 1.0 MG/DL    Est, Glom Filt Rate >60 >60 ml/min/1.73m2    Calcium 9.3 8.3 - 10.4 MG/DL    Total Bilirubin 0.2 0.2 - 1.1 MG/DL    ALT 12 (L) 13.0 - 61.0 U/L    AST 22 15 - 37 U/L    Alk Phosphatase 56 45.0 - 117.0 U/L    Total Protein 7.3 6.4 - 8.2 g/dL    Albumin 4.4 3.5 - 5.0 g/dL    Globulin 2.9 2.8 - 4.5 g/dL    Albumin/Globulin Ratio 1.5 0.4 - 1.6     Pregnancy, Urine   Result Value Ref Range    HCG(Urine) Pregnancy Test Negative     Urinalysis w rflx microscopic   Result Value Ref Range    Color, UA YELLOW      Appearance CLEAR      Specific Gravity, UA 1.010 1.001 - 1.023      pH, Urine 6.0 5.0 - 9.0      Protein, UA Negative NEG mg/dL    Glucose, UA Negative mg/dL    Ketones, Urine Negative NEG mg/dL    Bilirubin Urine Negative NEG      Blood, Urine Negative NEG      Urobilinogen, Urine 0.2 0.2 - 1.0 EU/dL    Nitrite, Urine Negative NEG      Leukocyte Esterase, Urine Negative NEG          No orders to display                         Voice dictation software was used during the making of this note. This software is not perfect and grammatical and other typographical errors may be present. This note has not been completely proofread for errors.        Jojo Harper, DO  10/22/22 172 Taqueria Sweeney, DO  11/23/22 5307

## 2022-10-22 NOTE — ED NOTES
I have reviewed discharge instructions with the patient. The patient verbalized understanding. Patient left ED via Discharge Method: ambulatory to Home with Self. Opportunity for questions and clarification provided. Patient given 1 scripts. To continue your aftercare when you leave the hospital, you may receive an automated call from our care team to check in on how you are doing. This is a free service and part of our promise to provide the best care and service to meet your aftercare needs.  If you have questions, or wish to unsubscribe from this service please call [phone]. Thank you for Choosing our [facility] Emergency Department.        Jewell Bowles RN  10/22/22 1018

## 2023-01-04 ENCOUNTER — TELEPHONE (OUTPATIENT)
Dept: OBGYN CLINIC | Age: 33
End: 2023-01-04

## 2023-01-04 NOTE — TELEPHONE ENCOUNTER
Call from pt with questions about spotting and cramping. Returned pt's call. States she has been having mild spotting and mild cramping for the past few days. States cramping tends to be more on her left side where she usually feels her period cramps and reports that she has a fibroid. Advised pt that spotting and mild cramping are normal in pregnancy. Instructed pt to notify us if bleeding becomes more than spotting. Instructed pt to present to [address] [place] [address] L&D if pelvic pain worsening. Pt verbalized understanding.

## 2023-01-05 ENCOUNTER — APPOINTMENT (OUTPATIENT)
Dept: ULTRASOUND IMAGING | Age: 33
End: 2023-01-05
Payer: COMMERCIAL

## 2023-01-05 ENCOUNTER — HOSPITAL ENCOUNTER (EMERGENCY)
Age: 33
Discharge: HOME | End: 2023-01-05
Attending: EMERGENCY MEDICINE
Payer: COMMERCIAL

## 2023-01-05 VITALS
OXYGEN SATURATION: 100 % | HEART RATE: 76 BPM | HEIGHT: 65 IN | TEMPERATURE: 97.7 F | BODY MASS INDEX: 24.99 KG/M2 | WEIGHT: 150 LBS | SYSTOLIC BLOOD PRESSURE: 113 MMHG | RESPIRATION RATE: 18 BRPM | DIASTOLIC BLOOD PRESSURE: 67 MMHG

## 2023-01-05 DIAGNOSIS — O20.9 VAGINAL BLEEDING AFFECTING EARLY PREGNANCY: Primary | ICD-10-CM

## 2023-01-05 LAB
ABO + RH BLD: NORMAL
ALBUMIN SERPL-MCNC: 4.5 G/DL (ref 3.5–5)
ALBUMIN/GLOB SERPL: 1.6 {RATIO} (ref 0.4–1.6)
ALP SERPL-CCNC: 44 U/L (ref 45–117)
ALT SERPL-CCNC: 17 U/L (ref 13–61)
ANION GAP SERPL CALC-SCNC: 13 MMOL/L (ref 2–11)
APPEARANCE UR: CLEAR
AST SERPL-CCNC: 20 U/L (ref 15–37)
BASOPHILS # BLD: 0.1 K/UL (ref 0–0.2)
BASOPHILS NFR BLD: 1 % (ref 0–2)
BILIRUB SERPL-MCNC: 0.2 MG/DL (ref 0.2–1.1)
BILIRUB UR QL: NEGATIVE
BUN SERPL-MCNC: 10 MG/DL (ref 7–18)
CALCIUM SERPL-MCNC: 9.8 MG/DL (ref 8.3–10.4)
CHLORIDE SERPL-SCNC: 104 MMOL/L (ref 98–107)
CO2 SERPL-SCNC: 26 MMOL/L (ref 21–32)
COLOR UR: YELLOW
CREAT SERPL-MCNC: 0.62 MG/DL (ref 0.6–1)
DIFFERENTIAL METHOD BLD: NORMAL
EOSINOPHIL # BLD: 0.4 K/UL (ref 0–0.8)
EOSINOPHIL NFR BLD: 4 % (ref 0.5–7.8)
ERYTHROCYTE [DISTWIDTH] IN BLOOD BY AUTOMATED COUNT: 12.7 % (ref 11.9–14.6)
GLOBULIN SER CALC-MCNC: 2.8 G/DL (ref 2.8–4.5)
GLUCOSE SERPL-MCNC: 103 MG/DL (ref 65–100)
GLUCOSE UR STRIP.AUTO-MCNC: NEGATIVE MG/DL
HCG SERPL-ACNC: 1148 MIU/ML (ref 0–6)
HCT VFR BLD AUTO: 39.2 % (ref 35.8–46.3)
HGB BLD-MCNC: 13.3 G/DL (ref 11.7–15.4)
HGB UR QL STRIP: NEGATIVE
IMM GRANULOCYTES # BLD AUTO: 0 K/UL (ref 0–0.5)
IMM GRANULOCYTES NFR BLD AUTO: 0 % (ref 0–5)
KETONES UR QL STRIP.AUTO: NEGATIVE MG/DL
LEUKOCYTE ESTERASE UR QL STRIP.AUTO: NEGATIVE
LYMPHOCYTES # BLD: 2.5 K/UL (ref 0.5–4.6)
LYMPHOCYTES NFR BLD: 30 % (ref 13–44)
MCH RBC QN AUTO: 31.4 PG (ref 26.1–32.9)
MCHC RBC AUTO-ENTMCNC: 33.9 G/DL (ref 31.4–35)
MCV RBC AUTO: 92.7 FL (ref 82–102)
MONOCYTES # BLD: 0.7 K/UL (ref 0.1–1.3)
MONOCYTES NFR BLD: 9 % (ref 4–12)
NEUTS SEG # BLD: 4.6 K/UL (ref 1.7–8.2)
NEUTS SEG NFR BLD: 56 % (ref 43–78)
NITRITE UR QL STRIP.AUTO: NEGATIVE
NRBC # BLD: 0 K/UL (ref 0–0.2)
PH UR STRIP: 7.5 [PH] (ref 5–9)
PLATELET # BLD AUTO: 261 K/UL (ref 150–450)
PMV BLD AUTO: 9.6 FL (ref 9.4–12.3)
POTASSIUM SERPL-SCNC: 3.6 MMOL/L (ref 3.5–5.1)
PROT SERPL-MCNC: 7.3 G/DL (ref 6.4–8.2)
PROT UR STRIP-MCNC: NEGATIVE MG/DL
RBC # BLD AUTO: 4.23 M/UL (ref 4.05–5.2)
SODIUM SERPL-SCNC: 143 MMOL/L (ref 133–143)
SP GR UR REFRACTOMETRY: 1.02 (ref 1–1.02)
UROBILINOGEN UR QL STRIP.AUTO: 0.2 EU/DL (ref 0.2–1)
WBC # BLD AUTO: 8.2 K/UL (ref 4.3–11.1)

## 2023-01-05 PROCEDURE — 80053 COMPREHEN METABOLIC PANEL: CPT

## 2023-01-05 PROCEDURE — 85025 COMPLETE CBC W/AUTO DIFF WBC: CPT

## 2023-01-05 PROCEDURE — 81003 URINALYSIS AUTO W/O SCOPE: CPT

## 2023-01-05 PROCEDURE — 86901 BLOOD TYPING SEROLOGIC RH(D): CPT

## 2023-01-05 PROCEDURE — 76815 OB US LIMITED FETUS(S): CPT

## 2023-01-05 PROCEDURE — 84702 CHORIONIC GONADOTROPIN TEST: CPT

## 2023-01-05 PROCEDURE — 99284 EMERGENCY DEPT VISIT MOD MDM: CPT

## 2023-01-05 ASSESSMENT — PAIN DESCRIPTION - PAIN TYPE: TYPE: ACUTE PAIN

## 2023-01-05 ASSESSMENT — ENCOUNTER SYMPTOMS
ABDOMINAL PAIN: 1
EYES NEGATIVE: 1
RESPIRATORY NEGATIVE: 1

## 2023-01-05 ASSESSMENT — PAIN DESCRIPTION - ORIENTATION: ORIENTATION: LEFT;LOWER

## 2023-01-05 ASSESSMENT — PAIN - FUNCTIONAL ASSESSMENT
PAIN_FUNCTIONAL_ASSESSMENT: NONE - DENIES PAIN
PAIN_FUNCTIONAL_ASSESSMENT: 0-10

## 2023-01-05 ASSESSMENT — PAIN DESCRIPTION - LOCATION: LOCATION: ABDOMEN

## 2023-01-05 ASSESSMENT — PAIN DESCRIPTION - FREQUENCY: FREQUENCY: CONTINUOUS

## 2023-01-05 ASSESSMENT — PAIN SCALES - GENERAL: PAINLEVEL_OUTOF10: 3

## 2023-01-05 NOTE — ED PROVIDER NOTES
Emergency Department Provider Note                   PCP:                Clifton Seip, NP, APRN - NP               Age: 29 y.o. Sex: female       ICD-10-CM    1. Vaginal bleeding affecting early pregnancy  O20.9           DISPOSITION Decision To Discharge 01/05/2023 05:13:09 AM        Medical Decision Making  Patient presents with stable vital signs. Benign physical exam.  Nontender abdomen. Patient states she believes she is 5 weeks pregnant and having vaginal spotting. Today her beta hCG is 1100. Ultrasound states small 6 mm anechoic fluid collection along the endometrial stripe possibly an early gestational sac although no internal yolk sac or fetal pole is yet visualized for confirmation. I did discuss this case with OB/GYN hospitalist.  She said due to the beta-hCG being less than 2000 cannot confirm intrauterine pregnancy via ultrasound. I also discussed with the patient that this is a low beta-hCG for her timeframe of 5 weeks. We discussed patient needing follow-up beta-hCG in 48 hours. We also discussed if there is any worsening symptoms including worsening abdominal pain, vaginal bleeding, syncope or any other concerning signs or symptoms she is return to the emergency department otherwise follow-up with her OB/GYN with the return beta-hCG results. Patient is happy with this plan is stable for discharge home  Side note patient did not have any urinary tract infection symptoms and urinalysis was negative. She also had a previous Rh+ did not require RhoGAM.    Amount and/or Complexity of Data Reviewed  Labs: ordered. Radiology: ordered.                                 Orders Placed This Encounter   Procedures    US OB 1 OR MORE FETUS LIMITED    CBC with Auto Differential    Urinalysis    HCG, Quantitative, Pregnancy    Comprehensive Metabolic Panel    ABO/RH        Medications - No data to display    New Prescriptions    No medications on file        Shalini Mason is a 29 y.o. female who presents to the Emergency Department with chief complaint of    Chief Complaint   Patient presents with    Pregnancy Problem      28-year-old female presents with vaginal bleeding. States she is 5 weeks pregnant by menstrual cycle. Has had 4 days of light spotting. Today was slightly heavier. She had abdominal cramping today. Denies any trauma, fever, significant change in vaginal discharge. Does endorse some nausea        Review of Systems   HENT: Negative. Eyes: Negative. Respiratory: Negative. Cardiovascular: Negative. Gastrointestinal:  Positive for abdominal pain. Genitourinary:  Positive for vaginal bleeding. Musculoskeletal: Negative. Skin: Negative. Neurological: Negative. Psychiatric/Behavioral: Negative. Past Medical History:   Diagnosis Date    Migraine         No past surgical history on file. No family history on file. Social History     Socioeconomic History    Marital status:    Tobacco Use    Smoking status: Never    Smokeless tobacco: Never   Substance and Sexual Activity    Drug use: Never         Patient has no known allergies. Previous Medications    METOCLOPRAMIDE (REGLAN) 5 MG TABLET    Take 1 tablet by mouth 3 times daily as needed (nausea/vomiting)        Vitals signs and nursing note reviewed. Patient Vitals for the past 4 hrs:   Temp Pulse Resp BP SpO2   01/05/23 0304 98.8 °F (37.1 °C) 82 16 130/82 100 %          Physical Exam  Constitutional:       General: She is not in acute distress. Appearance: Normal appearance. She is not ill-appearing. HENT:      Head: Normocephalic and atraumatic. Nose: No rhinorrhea. Mouth/Throat:      Mouth: Mucous membranes are moist.   Eyes:      Extraocular Movements: Extraocular movements intact. Cardiovascular:      Rate and Rhythm: Normal rate and regular rhythm. Pulmonary:      Effort: Pulmonary effort is normal.      Breath sounds: Normal breath sounds.    Abdominal: General: Abdomen is flat. Bowel sounds are normal. There is no distension. Palpations: Abdomen is soft. Tenderness: There is no abdominal tenderness. Musculoskeletal:         General: Normal range of motion. Cervical back: Normal range of motion. Skin:     General: Skin is warm and dry. Neurological:      General: No focal deficit present. Mental Status: She is alert. Psychiatric:         Mood and Affect: Mood normal.        Procedures    Results for orders placed or performed during the hospital encounter of 01/05/23   US OB 1 OR MORE FETUS LIMITED    Narrative    EXAM: First trimester OB ultrasound. INDICATION: Pelvic pain and vaginal bleeding. COMPARISON: None. TECHNIQUE: Transabdominal and transvaginal ultrasound images of the pelvis were  obtained. FINDINGS: The uterus measures 9.9 x 5.6 x 7.7 cm, with a 1.6 cm thick  endometrial stripe. There is a 1.3 cm rounded shadowing structure in the  anterior myometrium, with the appearance of a calcified fibroid. There is a 6 mm  anechoic fluid collection along the endometrial stripe, which may be a very  early gestational sac although no fetal pole or yolk sac is visualized. There is  no free pelvic fluid or adnexal mass. The right ovary measures 3.0 x 1.8 x 2.2  cm and contains a 1.6 x 1.7 x 1.3 cm hypoechoic region which may be a corpus  luteum. The left ovary measures 3.1 x 1.6 x 2.4 cm and has a normal appearance. There is vascular flow in both ovaries. No free pelvic fluid or adnexal masses  are seen. Impression    1. Small 6 mm anechoic fluid collection along the endometrial stripe, possibly  an early gestational sac although no internal yolk sac or fetal pole is yet  visualized for confirmation. Follow-up is recommended. 2. 1.3 cm calcified uterine fibroid. 3. 1.7 cm right ovary corpus luteum.     CBC with Auto Differential   Result Value Ref Range    WBC 8.2 4.3 - 11.1 K/uL    RBC 4.23 4.05 - 5.20 M/uL Hemoglobin 13.3 11.7 - 15.4 g/dL    Hematocrit 39.2 35.8 - 46.3 %    MCV 92.7 82.0 - 102.0 FL    MCH 31.4 26.1 - 32.9 PG    MCHC 33.9 31.4 - 35.0 g/dL    RDW 12.7 11.9 - 14.6 %    Platelets 433 360 - 111 K/uL    MPV 9.6 9.4 - 12.3 FL    nRBC 0.00 0.0 - 0.2 K/uL    Differential Type AUTOMATED      Seg Neutrophils 56 43 - 78 %    Lymphocytes 30 13 - 44 %    Monocytes 9 4.0 - 12.0 %    Eosinophils % 4 0.5 - 7.8 %    Basophils 1 0.0 - 2.0 %    Immature Granulocytes 0 0.0 - 5.0 %    Segs Absolute 4.6 1.7 - 8.2 K/UL    Absolute Lymph # 2.5 0.5 - 4.6 K/UL    Absolute Mono # 0.7 0.1 - 1.3 K/UL    Absolute Eos # 0.4 0.0 - 0.8 K/UL    Basophils Absolute 0.1 0.0 - 0.2 K/UL    Absolute Immature Granulocyte 0.0 0.0 - 0.5 K/UL   HCG, Quantitative, Pregnancy   Result Value Ref Range    hCG Quant 1,148 (H) 0.0 - 6.0 MIU/ML   Comprehensive Metabolic Panel   Result Value Ref Range    Sodium 143 133 - 143 mmol/L    Potassium 3.6 3.5 - 5.1 mmol/L    Chloride 104 98 - 107 mmol/L    CO2 26 21 - 32 mmol/L    Anion Gap 13 (H) 2 - 11 mmol/L    Glucose 103 (H) 65 - 100 mg/dL    BUN 10 7.0 - 18.0 MG/DL    Creatinine 0.62 0.6 - 1.0 MG/DL    Est, Glom Filt Rate >60 >60 ml/min/1.73m2    Calcium 9.8 8.3 - 10.4 MG/DL    Total Bilirubin 0.2 0.2 - 1.1 MG/DL    ALT 17 13.0 - 61.0 U/L    AST 20 15 - 37 U/L    Alk Phosphatase 44 (L) 45.0 - 117.0 U/L    Total Protein 7.3 6.4 - 8.2 g/dL    Albumin 4.5 3.5 - 5.0 g/dL    Globulin 2.8 2.8 - 4.5 g/dL    Albumin/Globulin Ratio 1.6 0.4 - 1.6          US OB 1 OR MORE FETUS LIMITED   Final Result   1. Small 6 mm anechoic fluid collection along the endometrial stripe, possibly   an early gestational sac although no internal yolk sac or fetal pole is yet   visualized for confirmation. Follow-up is recommended. 2. 1.3 cm calcified uterine fibroid. 3. 1.7 cm right ovary corpus luteum. Voice dictation software was used during the making of this note.   This software is not perfect and grammatical and other typographical errors may be present. This note has not been completely proofread for errors.      Dilip Can MD  01/05/23 C/ Clara Booth MD  01/05/23 6997

## 2023-01-05 NOTE — ED TRIAGE NOTES
Ambulatory with steady gait into triage. Reports vaginal bleeding/\"spotting\" over past 4 days. States worsening tonight while getting ready for bed. Associated LLQ pain, describes as \"dull\" alternating with \"sharp stabbing pain\".  G2.

## 2023-01-05 NOTE — DISCHARGE INSTRUCTIONS
Please get the beta-hCG blood test in 48 hours. Follow-up with OB/GYN. If you have any worsening abdominal pain, vaginal bleeding, syncope or passing out or any other concerning signs or symptoms please return to the emergency department.

## 2023-01-05 NOTE — ED NOTES
I have reviewed discharge instructions with the patient. The patient verbalized understanding. Patient left ED via Discharge Method: ambulatory to Home with spouse. Opportunity for questions and clarification provided. Patient given 0 scripts. Work note provided by MD        To continue your aftercare when you leave the hospital, you may receive an automated call from our care team to check in on how you are doing. This is a free service and part of our promise to provide the best care and service to meet your aftercare needs.  If you have questions, or wish to unsubscribe from this service please call [phone]. Thank you for Choosing our [facility] Emergency Department.       Zaire Willis RN  01/05/23 0600

## 2023-01-05 NOTE — Clinical Note
Laya Coker was seen and treated in our emergency department on 1/5/2023. She may return to work on 01/06/2023. If you have any questions or concerns, please don't hesitate to call.       Briseyda Patton MD

## 2023-01-07 ENCOUNTER — HOSPITAL ENCOUNTER (EMERGENCY)
Age: 33
Discharge: HOME | End: 2023-01-07
Attending: EMERGENCY MEDICINE
Payer: COMMERCIAL

## 2023-01-07 VITALS
SYSTOLIC BLOOD PRESSURE: 111 MMHG | DIASTOLIC BLOOD PRESSURE: 72 MMHG | HEART RATE: 74 BPM | BODY MASS INDEX: 24.99 KG/M2 | RESPIRATION RATE: 14 BRPM | WEIGHT: 150 LBS | HEIGHT: 65 IN | TEMPERATURE: 97.7 F | OXYGEN SATURATION: 100 %

## 2023-01-07 DIAGNOSIS — O20.0 THREATENED MISCARRIAGE IN EARLY PREGNANCY: Primary | ICD-10-CM

## 2023-01-07 LAB — HCG SERPL-ACNC: 1865 MIU/ML (ref 0–6)

## 2023-01-07 PROCEDURE — 99283 EMERGENCY DEPT VISIT LOW MDM: CPT

## 2023-01-07 PROCEDURE — 84702 CHORIONIC GONADOTROPIN TEST: CPT

## 2023-01-07 ASSESSMENT — ENCOUNTER SYMPTOMS
VOMITING: 0
VOICE CHANGE: 0
COLOR CHANGE: 0
NAUSEA: 0
ABDOMINAL PAIN: 0
TROUBLE SWALLOWING: 0
SHORTNESS OF BREATH: 0
PHOTOPHOBIA: 0
STRIDOR: 0
BACK PAIN: 0

## 2023-01-07 ASSESSMENT — PAIN - FUNCTIONAL ASSESSMENT: PAIN_FUNCTIONAL_ASSESSMENT: NONE - DENIES PAIN

## 2023-01-07 NOTE — ED PROVIDER NOTES
Emergency Department Provider Note                   PCP:                Kade Garcia NP, APRN - NP               Age: 29 y.o. Sex: female     No diagnosis found. DISPOSITION          Medical Decision Making  Blood type known to be a positive    Will perform repeat hCG quantitative    No focal abdominal tenderness on exam    6:28 AM  hCG quantitative today is 1865. Discussed with patient my concern given that numbers not doubled. Nevertheless we will have her call and follow-up with her OB/GYN on Monday for repeat hCG quantitative. Shared decision making  Will continue with threatened miscarriage precautions    Amount and/or Complexity of Data Reviewed  External Data Reviewed: labs. Labs: ordered. Decision-making details documented in ED Course. Orders Placed This Encounter   Procedures    HCG, Quantitative, Pregnancy        Medications - No data to display    New Prescriptions    No medications on file        Beatriz Mercado is a 29 y.o. female who presents to the Emergency Department with chief complaint of    Chief Complaint   Patient presents with    Pregnancy Test      Patient presents to the ER mostly for repeat hCG quantitative. Reports she has had some lower pelvic pain and cramping for the past week. Was seen here 2 days ago had an ultrasound that showed no definitive IUP with hCG quantitative of 1150. Patient was instructed to return in 48 hours for repeat hCG. Reports she continued to have some spotting. Denies any localizing abdominal pain. Denies any fevers or vomiting. The history is provided by the patient. Review of Systems   Constitutional:  Negative for fatigue and fever. HENT:  Negative for trouble swallowing and voice change. Eyes:  Negative for photophobia and visual disturbance. Respiratory:  Negative for shortness of breath and stridor. Cardiovascular:  Negative for palpitations and leg swelling. Gastrointestinal:  Negative for abdominal pain, nausea and vomiting. Endocrine: Negative for polyphagia and polyuria. Genitourinary:  Positive for vaginal bleeding. Negative for flank pain, frequency and urgency. Musculoskeletal:  Negative for back pain and gait problem. Skin:  Negative for color change and pallor. Neurological:  Negative for tremors, weakness, light-headedness and numbness. Hematological:  Does not bruise/bleed easily. Psychiatric/Behavioral:  Negative for behavioral problems, confusion, sleep disturbance and suicidal ideas. All other systems reviewed and are negative. Past Medical History:   Diagnosis Date    Migraine         No past surgical history on file. No family history on file. Social History     Socioeconomic History    Marital status:    Tobacco Use    Smoking status: Never    Smokeless tobacco: Never   Substance and Sexual Activity    Drug use: Never         Patient has no known allergies. Previous Medications    METOCLOPRAMIDE (REGLAN) 5 MG TABLET    Take 1 tablet by mouth 3 times daily as needed (nausea/vomiting)        Vitals signs and nursing note reviewed. Patient Vitals for the past 4 hrs:   Temp Pulse Resp BP SpO2   01/07/23 0524 97.7 °F (36.5 °C) 78 16 115/82 100 %          Physical Exam  Vitals and nursing note reviewed. Constitutional:       Appearance: Normal appearance. HENT:      Head: Normocephalic and atraumatic. Right Ear: External ear normal.      Left Ear: External ear normal.   Cardiovascular:      Rate and Rhythm: Normal rate and regular rhythm. Pulses: Normal pulses. Heart sounds: Normal heart sounds. Pulmonary:      Effort: Pulmonary effort is normal.      Breath sounds: Normal breath sounds. Abdominal:      General: Abdomen is flat. Palpations: Abdomen is soft. Musculoskeletal:      Cervical back: Normal range of motion and neck supple.    Skin:     Capillary Refill: Capillary refill takes less than 2 seconds. Neurological:      General: No focal deficit present. Mental Status: She is alert and oriented to person, place, and time. Cranial Nerves: No cranial nerve deficit. Sensory: No sensory deficit. Psychiatric:         Mood and Affect: Mood normal.         Behavior: Behavior normal.        Procedures    No results found for any visits on 01/07/23. No orders to display                       Voice dictation software was used during the making of this note. This software is not perfect and grammatical and other typographical errors may be present. This note has not been completely proofread for errors.      Morgan Estrada MD  01/07/23 1701 S Kelvin Huffman MD  01/07/23 8925

## 2023-01-07 NOTE — ED TRIAGE NOTES
Pt ambulatory to ED from home. Pt reports being here two days ago for pain on the lower left side and spotting. Tests were inconclusive on if the pt was having a miscarriage or eptopic pregnancy. The MD on shift wanted her to come back in 48hrs and have a blood redraw to check hcg levels. Pt is still bleeding (day 6 of spotting), discharge is red, brown and pink.

## 2023-01-07 NOTE — ED NOTES
I have reviewed discharge instructions with the patient. The patient verbalized understanding. Patient left ED via Discharge Method: ambulatory to Home with (family). Opportunity for questions and clarification provided. Patient given 0 scripts. To continue your aftercare when you leave the hospital, you may receive an automated call from our care team to check in on how you are doing. This is a free service and part of our promise to provide the best care and service to meet your aftercare needs.  If you have questions, or wish to unsubscribe from this service please call [phone]. Thank you for Choosing our [facility] Emergency Department.        Ravinder Huerta RN  01/07/23 1037

## 2023-01-07 NOTE — DISCHARGE INSTRUCTIONS
Arrange appointment with your OB/GYN office to have repeat hCG quantitative on Monday  Limiting strenuous activity  Return to the ER for any new, worsening or life-threatening symptoms

## 2023-01-09 ENCOUNTER — TELEPHONE (OUTPATIENT)
Dept: OBGYN CLINIC | Age: 33
End: 2023-01-09

## 2023-01-09 DIAGNOSIS — O36.80X0 PREGNANCY OF UNKNOWN ANATOMIC LOCATION: Primary | ICD-10-CM

## 2023-01-09 NOTE — TELEPHONE ENCOUNTER
Per , pt needs repeat lab and ultrasound this week. Repeat HCG quant on 1/10/23 and GYNUS on 1/11/23 with Kevin Haney. Future order placed. Precautions reviewed.

## 2023-01-09 NOTE — TELEPHONE ENCOUNTER
Pt went to ED on 1/5/23 and 1/7/23 for LLQ pain and vaginal spotting. Pt is 5w4d by LMP of 12/1/22. Pt reports LLQ pain has now subsided but bleeding has increased. Pt states she has mild period like cramps. Pt reports on and off heavy bright red vaginal bleeding with \"small clots. \"    HCG quant- 1,881 on 1/5/23  HCG quant- 1,865 on 1/7/23  Blood type: A +    Pelvic ultrasound on 1/5/23  Impression   1. Small 6 mm anechoic fluid collection along the endometrial stripe, possibly   an early gestational sac although no internal yolk sac or fetal pole is yet   visualized for confirmation. Follow-up is recommended. 2. 1.3 cm calcified uterine fibroid. 3. 1.7 cm right ovary corpus luteum. Encounter routed to [address] [facility] for review as pt went to [facility] location to follow up.

## 2023-01-10 DIAGNOSIS — O36.80X0 PREGNANCY OF UNKNOWN ANATOMIC LOCATION: ICD-10-CM

## 2023-01-11 ENCOUNTER — OFFICE VISIT (OUTPATIENT)
Dept: OBGYN CLINIC | Age: 33
End: 2023-01-11
Payer: COMMERCIAL

## 2023-01-11 VITALS
BODY MASS INDEX: 23.99 KG/M2 | WEIGHT: 144 LBS | HEIGHT: 65 IN | DIASTOLIC BLOOD PRESSURE: 70 MMHG | SYSTOLIC BLOOD PRESSURE: 102 MMHG

## 2023-01-11 DIAGNOSIS — D25.1 INTRAMURAL LEIOMYOMA OF UTERUS: ICD-10-CM

## 2023-01-11 DIAGNOSIS — O36.80X0 PREGNANCY OF UNKNOWN ANATOMIC LOCATION: Primary | ICD-10-CM

## 2023-01-11 PROCEDURE — 99203 OFFICE O/P NEW LOW 30 MIN: CPT | Performed by: OBSTETRICS & GYNECOLOGY

## 2023-01-11 PROCEDURE — 76830 TRANSVAGINAL US NON-OB: CPT | Performed by: OBSTETRICS & GYNECOLOGY

## 2023-01-11 NOTE — PROGRESS NOTES
CC:   Chief Complaint   Patient presents with    Follow-up     Cramping and bleeding x 10 days         HPI:    Meng Pardo  is a 29 y.o. , , No LMP recorded. Patient is pregnant. ,  who is seen for pregnancy of unknown location. Has been bleeding on/off since early January. Sometimes light. Sometimes heavier. Not changing more and 2 pads per day. US today: GS measuring 5.2 weeks. No YS. No evidence of ectopic. EMS 18mm     HCG 1148 >  HCG 1865 ( > 40% rise)     RH positive       GYN HISTORY:  As per HPI       Current Outpatient Medications on File Prior to Visit   Medication Sig Dispense Refill    metoclopramide (REGLAN) 5 MG tablet Take 1 tablet by mouth 3 times daily as needed (nausea/vomiting) 30 tablet 0     No current facility-administered medications on file prior to visit. Past Medical History:   Diagnosis Date    Migraine          History reviewed. No pertinent surgical history. Outpatient Encounter Medications as of 2023   Medication Sig Dispense Refill    metoclopramide (REGLAN) 5 MG tablet Take 1 tablet by mouth 3 times daily as needed (nausea/vomiting) 30 tablet 0     No facility-administered encounter medications on file as of 2023. No Known Allergies      History reviewed. No pertinent family history. Social History     Socioeconomic History    Marital status:      Spouse name: None    Number of children: None    Years of education: None    Highest education level: None   Tobacco Use    Smoking status: Never    Smokeless tobacco: Never   Substance and Sexual Activity    Drug use: Never           ROS:  Review of Systems   Constitutional: Negative for chills, fever and weight loss. HENT: Negative for hearing loss. Eyes: Negative for blurred vision and double vision. Respiratory: Negative for cough, hemoptysis and shortness of breath. Cardiovascular: Negative for chest pain, palpitations and orthopnea.    Gastrointestinal: Negative for abdominal pain, blood in stool, constipation, diarrhea, nausea and vomiting. Genitourinary: Negative for dysuria, frequency, hematuria and urgency. Musculoskeletal: Negative for falls, joint pain and myalgias. Skin: Negative for itching and rash. Neurological: Negative for headaches. Endo/Heme/Allergies: Does not bruise/bleed easily. Psychiatric/Behavioral: Negative for depression and suicidal ideas. The patient is not nervous/anxious. All other systems reviewed and are negative. PHYSICAL EXAM:  /70   Ht 5' 5\" (1.651 m)   Wt 144 lb (65.3 kg)   BMI 23.96 kg/m²        Physical Exam:  Constitutional: She appears well-developed and well-nourished. No distress. HENT:    Head: Normocephalic and atraumatic. Cardiovascular: Regular pulse   Pulmonary/Chest: Effort normal  Skin: She is not diaphoretic. Psychiatric: She has a normal mood and affect. Her behavior is normal. Thought content normal. .         Counseling:    Patient counseled face to face for more than 50% of the total time spent with the patient. On this date I have spent 30 minutes reviewing previous notes, test results, and face to face with the patient discussing the diagnosis and importance of compliance with the treatment plan as well as documenting. ASSESSMENT/PLAN:   29 y.o., , with    Diagnosis Orders   1. Pregnancy of unknown anatomic location  AMB POC US, TRANSVAGINAL      2. Intramural leiomyoma of uterus  AMB POC US, TRANSVAGINAL          Orders Placed This Encounter   Procedures    AMB POC US, TRANSVAGINAL     Order Specific Question:   Reason for Exam:     Answer:   ED follow up     Order Specific Question:   Are you Pregnant? Answer:   No     Comments:   unknown       Discuused PUL with patient. Very desired pregnancy. Could be normal IUP, but bleeding is worriesome. No evidence of ectopic. Continue q 48 hour HCG levels. Repeat US in 14 days. If no FCA will have definative dx.  If HCG decreases before then will bring in prior to [address] at the [facility] [address] to discuss options.        Oliva Arroyo, DO

## 2023-01-12 ENCOUNTER — TELEPHONE (OUTPATIENT)
Dept: OBGYN CLINIC | Age: 33
End: 2023-01-12

## 2023-01-12 DIAGNOSIS — O36.80X0 PREGNANCY OF UNKNOWN ANATOMIC LOCATION: Primary | ICD-10-CM

## 2023-01-12 LAB — HCG SERPL-ACNC: 1997 MIU/ML (ref 0–6)

## 2023-01-12 NOTE — TELEPHONE ENCOUNTER
Pt called regarding HCG quant lab. Pt states she went to [facility] this morning for repeat hcg quant and the order was not available. Order placed. Pt states she will go back to the lab on 1/13/23.

## 2023-01-13 DIAGNOSIS — O36.80X0 PREGNANCY OF UNKNOWN ANATOMIC LOCATION: ICD-10-CM

## 2023-01-13 LAB — HCG SERPL-ACNC: 3122 MIU/ML (ref 0–6)

## 2023-01-16 DIAGNOSIS — O03.9 SAB (SPONTANEOUS ABORTION): Primary | ICD-10-CM

## 2023-01-17 DIAGNOSIS — O03.9 SAB (SPONTANEOUS ABORTION): ICD-10-CM

## 2023-01-17 LAB — HCG SERPL-ACNC: 273 MIU/ML (ref 0–6)

## 2023-01-18 ENCOUNTER — OFFICE VISIT (OUTPATIENT)
Dept: OBGYN CLINIC | Age: 33
End: 2023-01-18

## 2023-01-18 VITALS — BODY MASS INDEX: 23.66 KG/M2 | HEIGHT: 65 IN | WEIGHT: 142 LBS

## 2023-01-18 DIAGNOSIS — D25.1 INTRAMURAL LEIOMYOMA OF UTERUS: ICD-10-CM

## 2023-01-18 DIAGNOSIS — O03.9 SAB (SPONTANEOUS ABORTION): Primary | ICD-10-CM

## 2023-01-18 RX ORDER — MISOPROSTOL 200 UG/1
TABLET ORAL
Qty: 4 TABLET | Refills: 0 | Status: SHIPPED | OUTPATIENT
Start: 2023-01-18

## 2023-01-18 NOTE — PROGRESS NOTES
CC:   Chief Complaint   Patient presents with    Follow-up     Possible MAB         HPI:    Rhys Mcmahon  is a 29 y.o. , , No LMP recorded. ,  who is seen for pregnancy of unknown location, likely complete AB. After last visit bleeding increased and she thinks she passed GS. US last week: GS measuring 5.2 weeks. No YS. No evidence of ectopic. EMS 18mm     HCG 1148 >  HCG 1865 ( > 40% rise) > 3000 > increased bleeding and thinks she passed GS > 243    RH positive     US today GS gone, but some evidence of retains POCs with blood flow    GYN HISTORY:  As per HPI       Current Outpatient Medications on File Prior to Visit   Medication Sig Dispense Refill    metoclopramide (REGLAN) 5 MG tablet Take 1 tablet by mouth 3 times daily as needed (nausea/vomiting) 30 tablet 0     No current facility-administered medications on file prior to visit. Past Medical History:   Diagnosis Date    Migraine          History reviewed. No pertinent surgical history. Outpatient Encounter Medications as of 2023   Medication Sig Dispense Refill    miSOPROStol (CYTOTEC) 200 MCG tablet Insert 4 tab (800mcg) vaginally immediately. 4 tablet 0    metoclopramide (REGLAN) 5 MG tablet Take 1 tablet by mouth 3 times daily as needed (nausea/vomiting) 30 tablet 0     No facility-administered encounter medications on file as of 2023. No Known Allergies      History reviewed. No pertinent family history. Social History     Socioeconomic History    Marital status:      Spouse name: None    Number of children: None    Years of education: None    Highest education level: None   Tobacco Use    Smoking status: Never    Smokeless tobacco: Never   Substance and Sexual Activity    Drug use: Never           ROS:  Review of Systems   Constitutional: Negative for chills, fever and weight loss. HENT: Negative for hearing loss. Eyes: Negative for blurred vision and double vision.    Respiratory: Negative for cough, hemoptysis and shortness of breath. Cardiovascular: Negative for chest pain, palpitations and orthopnea. Gastrointestinal: Negative for abdominal pain, blood in stool, constipation, diarrhea, nausea and vomiting. Genitourinary: Negative for dysuria, frequency, hematuria and urgency. Musculoskeletal: Negative for falls, joint pain and myalgias. Skin: Negative for itching and rash. Neurological: Negative for headaches. Endo/Heme/Allergies: Does not bruise/bleed easily. Psychiatric/Behavioral: Negative for depression and suicidal ideas. The patient is not nervous/anxious. All other systems reviewed and are negative. PHYSICAL EXAM:  Ht 5' 5\" (1.651 m)   Wt 142 lb (64.4 kg)   Breastfeeding Unknown   BMI 23.63 kg/m²        Physical Exam:  Constitutional: She appears well-developed and well-nourished. No distress. HENT:    Head: Normocephalic and atraumatic. Cardiovascular: Regular pulse   Pulmonary/Chest: Effort normal  Skin: She is not diaphoretic. Psychiatric: She has a normal mood and affect. Her behavior is normal. Thought content normal.  Pelvic: old clot in vault. No active or heavy bleeding. Counseling:    Patient counseled face to face for more than 50% of the total time spent with the patient. On this date I have spent 30 minutes reviewing previous notes, test results, and face to face with the patient discussing the diagnosis and importance of compliance with the treatment plan as well as documenting. ASSESSMENT/PLAN:   29 y.o., , with    Diagnosis Orders   1. SAB (spontaneous )  AMB POC US, TRANSVAGINAL    [facility] Surgical Pathology      2. Intramural leiomyoma of uterus  AMB POC US, TRANSVAGINAL          Orders Placed This Encounter   Procedures    AMB POC US, TRANSVAGINAL     Order Specific Question:   Reason for Exam:     Answer:   sab     Order Specific Question:   Are you Pregnant?      Answer:   No    [facility] Surgical Pathology       SAB Counseling:  Pt counseled that early pregnancy loss occurs in 10% of all clinically recognized pregnancies. Genetic abnormalities are the most common etiology of early first trimester miscarriages. Pt reassured that there was nothing that she did to cause this, nor is there anything that she could have done to prevent this from happening. Options of expectant management vs Cytotec vs D&C given to the pt. Discussed potential risks and benefits of all of these. Expectant management is successful in 81% of pts w/ adequate time (up to 8wks). Cytotec (Misoprostol) reliabley reduces the need for a Suction D&C by up to 60% and shortens the time to passing of products. With expectant management she may experience significant blood loss and crampy abdominal pain with passage of products at home. Suction D&C has its own risks of anesthesia, uterine perforation, asherman syndrome, bleeding, or damage to nearby structures.        Bleeding precautions reviewed     Pt opts for Cytotec 800mcg     RTC next week for repeat [facility]

## 2023-01-25 ENCOUNTER — OFFICE VISIT (OUTPATIENT)
Dept: OBGYN CLINIC | Age: 33
End: 2023-01-25
Payer: COMMERCIAL

## 2023-01-25 VITALS — BODY MASS INDEX: 23.63 KG/M2 | DIASTOLIC BLOOD PRESSURE: 72 MMHG | HEIGHT: 65 IN | SYSTOLIC BLOOD PRESSURE: 130 MMHG

## 2023-01-25 DIAGNOSIS — O03.9 SAB (SPONTANEOUS ABORTION): ICD-10-CM

## 2023-01-25 DIAGNOSIS — D25.1 INTRAMURAL LEIOMYOMA OF UTERUS: Primary | ICD-10-CM

## 2023-01-25 PROCEDURE — 99213 OFFICE O/P EST LOW 20 MIN: CPT | Performed by: OBSTETRICS & GYNECOLOGY

## 2023-01-25 PROCEDURE — 76830 TRANSVAGINAL US NON-OB: CPT | Performed by: OBSTETRICS & GYNECOLOGY

## 2023-01-25 NOTE — PROGRESS NOTES
Result visit:       Chanelle Cesar is her today to discuss US results which was performed for incomplete AB  S/p cytotec with passage of tissue. Bleeding has now resolved and all cramping/sx have resided. Mood is stable. Vitals:    01/25/23 1022   BP: 130/72        Results/imaging:   EMS 11 mm and previously seen POCs with blood flow are no longer visualized    A/P:   Complete AB:   - Discussed again likely genetic cause. Can try again when ready.  PNV recommended           Oliva Arroyo DO

## 2023-07-21 RX ORDER — DOXYLAMINE SUCCINATE AND PYRIDOXINE HYDROCHLORIDE, DELAYED RELEASE TABLETS 10 MG/10 MG 10; 10 MG/1; MG/1
1-2 TABLET, DELAYED RELEASE ORAL NIGHTLY
COMMUNITY
Start: 2023-03-28 | End: 2023-09-20 | Stop reason: ALTCHOICE

## 2023-07-24 PROBLEM — D25.1 INTRAMURAL LEIOMYOMA OF UTERUS: Status: RESOLVED | Noted: 2018-01-10 | Resolved: 2023-07-24

## 2023-07-25 ENCOUNTER — ROUTINE PRENATAL (OUTPATIENT)
Dept: OBGYN CLINIC | Age: 33
End: 2023-07-25

## 2023-07-25 VITALS — SYSTOLIC BLOOD PRESSURE: 123 MMHG | HEART RATE: 93 BPM | DIASTOLIC BLOOD PRESSURE: 74 MMHG

## 2023-07-25 DIAGNOSIS — D25.1 INTRAMURAL LEIOMYOMA OF UTERUS: ICD-10-CM

## 2023-07-25 DIAGNOSIS — O35.EXX0 FETAL HYDRONEPHROSIS DURING PREGNANCY, ANTEPARTUM, SINGLE OR UNSPECIFIED FETUS: Primary | ICD-10-CM

## 2023-07-25 DIAGNOSIS — G43.919 INTRACTABLE MIGRAINE WITHOUT STATUS MIGRAINOSUS, UNSPECIFIED MIGRAINE TYPE: ICD-10-CM

## 2023-07-25 DIAGNOSIS — Z03.73 FETAL ANOMALY SUSPECTED BUT NOT FOUND: ICD-10-CM

## 2023-07-25 DIAGNOSIS — O09.92 HIGH-RISK PREGNANCY IN SECOND TRIMESTER: ICD-10-CM

## 2023-07-25 DIAGNOSIS — D25.9 UTERINE FIBROIDS AFFECTING PREGNANCY IN SECOND TRIMESTER: ICD-10-CM

## 2023-07-25 DIAGNOSIS — O34.12 UTERINE FIBROIDS AFFECTING PREGNANCY IN SECOND TRIMESTER: ICD-10-CM

## 2023-07-25 PROBLEM — O09.90 HIGH-RISK PREGNANCY: Status: ACTIVE | Noted: 2023-07-25

## 2023-07-25 RX ORDER — MAGNESIUM GLUCONATE 27 MG(500)
200 TABLET ORAL ONCE
COMMUNITY

## 2023-07-25 RX ORDER — CETIRIZINE HYDROCHLORIDE 10 MG/1
10 TABLET ORAL DAILY
COMMUNITY

## 2023-07-25 ASSESSMENT — PATIENT HEALTH QUESTIONNAIRE - PHQ9
2. FEELING DOWN, DEPRESSED OR HOPELESS: 0
SUM OF ALL RESPONSES TO PHQ QUESTIONS 1-9: 0
1. LITTLE INTEREST OR PLEASURE IN DOING THINGS: 0
SUM OF ALL RESPONSES TO PHQ QUESTIONS 1-9: 0
SUM OF ALL RESPONSES TO PHQ9 QUESTIONS 1 & 2: 0
SUM OF ALL RESPONSES TO PHQ QUESTIONS 1-9: 0
SUM OF ALL RESPONSES TO PHQ QUESTIONS 1-9: 0

## 2023-07-25 NOTE — PROGRESS NOTES
MFM Consultation    Kady Rojo (: 1990) is a 29 y.o. [facility] at 23w2d with 2023, by Other Basis. Presents for evaluation of the following chief complaint(s):  Ultrasound and High Risk Pregnancy (Hydronephrosis)     Patient is working as a  at [employer]. , Jaylen Morales, present today. Scheduled to see Primary OB Chetan Bonilla [facility]) on 2023. No HAs, edema. Denies preeclamptic symptoms. Reports good fetal movement. No bleeding, LOF, cramping, ctxs, or vaginal pressure. Mild hydronephrosis/pelviectasis noted on ultrasound in OB office. Concern on that ultrasound for fetal ascites also. Reassuring evaluation today. No fetal ascites and very mild pelviectasis. Family reassured. Will reassess at ~30-32 weeks. History reviewed and updated as needed. Review of Systems - per HPI; otherwise unremarkable. Exam:     Vitals:    23 1552   BP: 123/74   Pulse: 93     Recent Labs Reviewed. Please see formal ultrasound report under imaging tab. ASSESSMENT/PLAN:  Patient Active Problem List    Diagnosis Date Noted    High-risk pregnancy 2023     Priority: High     Overview Note:     Possible Hydronephrosis Left Kidney; Fibroids. 23 [facility]: Normal anatomy and echo; AC 89%, Overall 54%, ABDI WDl (DVP 6.6cm) CL 4.5cm. Mild bilateral pelviectasis. No fetal ascites. Follow up UM 30-32 weeks      Uterine fibroids affecting pregnancy in second trimester 2023     Priority: Low       Mood evaluated today- Mood concerns: well; PHQ2 reviewed. Counseling re possibility of peripartum worsening. Mood Reassuring today  Recommend lifestyle/behavioral modifications to enhance mood (exercise, sunshine, mood apps, nutritional modifications, etc).        Addressed normal pregnancy complaints, reassured and offered suggestions for care  Reviewed gestational age precautions and activity goals/limitations  Nutritional counseling as well as specific 100

## 2023-08-22 ENCOUNTER — HOSPITAL ENCOUNTER (OUTPATIENT)
Age: 33
Discharge: HOME | End: 2023-08-22
Attending: OBSTETRICS & GYNECOLOGY | Admitting: OBSTETRICS & GYNECOLOGY
Payer: COMMERCIAL

## 2023-08-22 VITALS
TEMPERATURE: 97.8 F | HEIGHT: 65 IN | OXYGEN SATURATION: 96 % | DIASTOLIC BLOOD PRESSURE: 67 MMHG | SYSTOLIC BLOOD PRESSURE: 98 MMHG | WEIGHT: 169 LBS | RESPIRATION RATE: 18 BRPM | BODY MASS INDEX: 28.16 KG/M2 | HEART RATE: 84 BPM

## 2023-08-22 LAB
ALBUMIN SERPL-MCNC: 2.7 G/DL (ref 3.5–5)
ALBUMIN/GLOB SERPL: 0.6 (ref 0.4–1.6)
ALP SERPL-CCNC: 81 U/L (ref 50–130)
ALT SERPL-CCNC: 14 U/L (ref 12–65)
ANION GAP SERPL CALC-SCNC: 6 MMOL/L (ref 2–11)
AST SERPL-CCNC: 15 U/L (ref 15–37)
BASOPHILS # BLD: 0 K/UL (ref 0–0.2)
BASOPHILS NFR BLD: 0 % (ref 0–2)
BILIRUB SERPL-MCNC: 0.2 MG/DL (ref 0.2–1.1)
BUN SERPL-MCNC: 6 MG/DL (ref 6–23)
CALCIUM SERPL-MCNC: 8.7 MG/DL (ref 8.3–10.4)
CHLORIDE SERPL-SCNC: 110 MMOL/L (ref 101–110)
CO2 SERPL-SCNC: 24 MMOL/L (ref 21–32)
CREAT SERPL-MCNC: 0.59 MG/DL (ref 0.6–1)
CREAT UR-MCNC: 33 MG/DL
DIFFERENTIAL METHOD BLD: ABNORMAL
EOSINOPHIL # BLD: 0.2 K/UL (ref 0–0.8)
EOSINOPHIL NFR BLD: 2 % (ref 0.5–7.8)
ERYTHROCYTE [DISTWIDTH] IN BLOOD BY AUTOMATED COUNT: 12.7 % (ref 11.9–14.6)
GLOBULIN SER CALC-MCNC: 4.3 G/DL (ref 2.8–4.5)
GLUCOSE SERPL-MCNC: 84 MG/DL (ref 65–100)
HCT VFR BLD AUTO: 32.2 % (ref 35.8–46.3)
HGB BLD-MCNC: 10.3 G/DL (ref 11.7–15.4)
IMM GRANULOCYTES # BLD AUTO: 0.1 K/UL (ref 0–0.5)
IMM GRANULOCYTES NFR BLD AUTO: 0 % (ref 0–5)
LDH SERPL L TO P-CCNC: 140 U/L (ref 100–190)
LYMPHOCYTES # BLD: 1.8 K/UL (ref 0.5–4.6)
LYMPHOCYTES NFR BLD: 16 % (ref 13–44)
MAGNESIUM SERPL-MCNC: 1.9 MG/DL (ref 1.8–2.4)
MCH RBC QN AUTO: 28.9 PG (ref 26.1–32.9)
MCHC RBC AUTO-ENTMCNC: 32 G/DL (ref 31.4–35)
MCV RBC AUTO: 90.2 FL (ref 82–102)
MONOCYTES # BLD: 0.8 K/UL (ref 0.1–1.3)
MONOCYTES NFR BLD: 7 % (ref 4–12)
NEUTS SEG # BLD: 8.4 K/UL (ref 1.7–8.2)
NEUTS SEG NFR BLD: 74 % (ref 43–78)
NRBC # BLD: 0 K/UL (ref 0–0.2)
PLATELET # BLD AUTO: 284 K/UL (ref 150–450)
PMV BLD AUTO: 9.7 FL (ref 9.4–12.3)
POTASSIUM SERPL-SCNC: 3.5 MMOL/L (ref 3.5–5.1)
PROT SERPL-MCNC: 7 G/DL (ref 6.3–8.2)
PROT UR-MCNC: 6 MG/DL
PROT/CREAT UR-RTO: 0.2
RBC # BLD AUTO: 3.57 M/UL (ref 4.05–5.2)
SODIUM SERPL-SCNC: 140 MMOL/L (ref 133–143)
WBC # BLD AUTO: 11.3 K/UL (ref 4.3–11.1)

## 2023-08-22 PROCEDURE — 99285 EMERGENCY DEPT VISIT HI MDM: CPT

## 2023-08-22 PROCEDURE — 82570 ASSAY OF URINE CREATININE: CPT

## 2023-08-22 PROCEDURE — 83735 ASSAY OF MAGNESIUM: CPT

## 2023-08-22 PROCEDURE — 83615 LACTATE (LD) (LDH) ENZYME: CPT

## 2023-08-22 PROCEDURE — 80053 COMPREHEN METABOLIC PANEL: CPT

## 2023-08-22 PROCEDURE — 85025 COMPLETE CBC W/AUTO DIFF WBC: CPT

## 2023-08-22 PROCEDURE — 84156 ASSAY OF PROTEIN URINE: CPT

## 2023-08-22 PROCEDURE — 6370000000 HC RX 637 (ALT 250 FOR IP): Performed by: OBSTETRICS & GYNECOLOGY

## 2023-08-22 RX ORDER — METOCLOPRAMIDE 10 MG/1
10 TABLET ORAL
Status: DISCONTINUED | OUTPATIENT
Start: 2023-08-22 | End: 2023-08-22 | Stop reason: HOSPADM

## 2023-08-22 RX ORDER — DIPHENHYDRAMINE HCL 25 MG
25 CAPSULE ORAL EVERY 6 HOURS PRN
Status: DISCONTINUED | OUTPATIENT
Start: 2023-08-22 | End: 2023-08-22 | Stop reason: HOSPADM

## 2023-08-22 RX ADMIN — DIPHENHYDRAMINE HYDROCHLORIDE 25 MG: 25 CAPSULE ORAL at 11:13

## 2023-08-22 RX ADMIN — METOCLOPRAMIDE 10 MG: 10 TABLET ORAL at 11:13

## 2023-08-22 NOTE — PROGRESS NOTES
Discharge instruction given. Information/teaching printout given to patient. States understanding. All questions answered. Informed pt to return to hospital for decreased fetal movement, if she suspects her water breaks, if vaginal bleeding occurs that is more than spotting, or she starts to experience painful regular contractions 4-5 minutes apart. Pt verbalizes understanding. Discussed importance of follow up with primary MD. Ambulate out to personal auto with FOB at side. Pt stable at discharge.

## 2023-08-22 NOTE — PROGRESS NOTES
Patient presents to [facility] with complaints of migraine since Sunday. She states that she has visual disturbances coming and going since her headaches have started. MD William notified of arrival. Pre-e labs drawn and serial BPs.

## 2023-08-22 NOTE — H&P
History & Physical    Name: Real Crouch MRN: [id number]  SSN: [id number]    YOB: 1990  Age: 29 y.o. Sex: female      Subjective:     Reason for Triage visit:  27w2d and migraine headache    History of Present Illness: Ms. Eric Galeas is a 29 y.o.  female with an estimated gestational age of 33w4d with Estimated Date of Delivery: 23. Patient states that she is having a severe headache. She has some vision issues. This has happened to her before NOT pregnant. She states she suffers from migraines with vision changes. In past has done well with reglan  and benadryl. Took tylenol this am..  Patient denies abdominal pain  , chest pain, contractions, fever, nausea and vomiting, pelvic pressure, right upper quadrant pain  , shortness of breath, swelling, vaginal bleeding , and vaginal leaking of fluid . OB History    Para Term  AB Living   3 1 1   1 1   SAB IAB Ectopic Molar Multiple Live Births   1         1      # Outcome Date GA Lbr Rick/2nd Weight Sex Delivery Anes PTL Lv   3 Current            2 SAB 2023           1 Term 18 38w1d  6 lb 15.1 oz (3.15 kg) F Vag-Spont  N CINTIA     Past Medical History:   Diagnosis Date    Intramural leiomyoma of uterus 1/10/2018    3.7cm, anterior, behind the placenta     Migraine      (spontaneous vaginal delivery) 2018     No past surgical history on file. Social History     Occupational History    Not on file   Tobacco Use    Smoking status: Never    Smokeless tobacco: Never   Substance and Sexual Activity    Alcohol use: Not on file    Drug use: Never    Sexual activity: Not on file      No family history on file. No Known Allergies  Prior to Admission medications    Medication Sig Start Date End Date Taking?  Authorizing Provider   magnesium gluconate (MAGONATE) 500 MG tablet Take 200 mg by mouth once    Historical Provider, MD   cetirizine (ZYRTEC) 10 MG tablet Take 1 tablet by mouth daily    Historical

## 2023-09-06 PROBLEM — O26.893 PREGNANCY HEADACHE IN THIRD TRIMESTER: Status: ACTIVE | Noted: 2023-09-06

## 2023-09-06 PROBLEM — R51.9 PREGNANCY HEADACHE IN THIRD TRIMESTER: Status: ACTIVE | Noted: 2023-09-06

## 2023-09-06 PROBLEM — O24.410 DIET CONTROLLED GESTATIONAL DIABETES MELLITUS (GDM) IN THIRD TRIMESTER: Status: ACTIVE | Noted: 2023-09-06

## 2023-09-13 PROBLEM — O98.513 COVID-19 AFFECTING PREGNANCY IN THIRD TRIMESTER: Status: ACTIVE | Noted: 2023-09-13

## 2023-09-13 PROBLEM — U07.1 COVID-19 AFFECTING PREGNANCY IN THIRD TRIMESTER: Status: ACTIVE | Noted: 2023-09-13

## 2023-09-17 SDOH — ECONOMIC STABILITY: FOOD INSECURITY: WITHIN THE PAST 12 MONTHS, YOU WORRIED THAT YOUR FOOD WOULD RUN OUT BEFORE YOU GOT MONEY TO BUY MORE.: NEVER TRUE

## 2023-09-17 SDOH — ECONOMIC STABILITY: TRANSPORTATION INSECURITY
IN THE PAST 12 MONTHS, HAS LACK OF TRANSPORTATION KEPT YOU FROM MEETINGS, WORK, OR FROM GETTING THINGS NEEDED FOR DAILY LIVING?: NO

## 2023-09-17 SDOH — ECONOMIC STABILITY: FOOD INSECURITY: WITHIN THE PAST 12 MONTHS, THE FOOD YOU BOUGHT JUST DIDN'T LAST AND YOU DIDN'T HAVE MONEY TO GET MORE.: NEVER TRUE

## 2023-09-17 SDOH — ECONOMIC STABILITY: HOUSING INSECURITY
IN THE LAST 12 MONTHS, WAS THERE A TIME WHEN YOU DID NOT HAVE A STEADY PLACE TO SLEEP OR SLEPT IN A SHELTER (INCLUDING NOW)?: NO

## 2023-09-17 SDOH — ECONOMIC STABILITY: INCOME INSECURITY: HOW HARD IS IT FOR YOU TO PAY FOR THE VERY BASICS LIKE FOOD, HOUSING, MEDICAL CARE, AND HEATING?: NOT VERY HARD

## 2023-09-20 ENCOUNTER — OFFICE VISIT (OUTPATIENT)
Dept: OBGYN CLINIC | Age: 33
End: 2023-09-20
Payer: COMMERCIAL

## 2023-09-20 ENCOUNTER — ROUTINE PRENATAL (OUTPATIENT)
Dept: OBGYN CLINIC | Age: 33
End: 2023-09-20
Payer: COMMERCIAL

## 2023-09-20 VITALS — SYSTOLIC BLOOD PRESSURE: 112 MMHG | DIASTOLIC BLOOD PRESSURE: 72 MMHG

## 2023-09-20 DIAGNOSIS — O26.813 PREGNANCY RELATED FATIGUE IN THIRD TRIMESTER: ICD-10-CM

## 2023-09-20 DIAGNOSIS — O98.513 COVID-19 AFFECTING PREGNANCY IN THIRD TRIMESTER: ICD-10-CM

## 2023-09-20 DIAGNOSIS — U07.1 COVID-19 AFFECTING PREGNANCY IN THIRD TRIMESTER: ICD-10-CM

## 2023-09-20 DIAGNOSIS — O24.410 DIET CONTROLLED GESTATIONAL DIABETES MELLITUS (GDM) IN THIRD TRIMESTER: Primary | ICD-10-CM

## 2023-09-20 DIAGNOSIS — O09.93 HIGH-RISK PREGNANCY IN THIRD TRIMESTER: ICD-10-CM

## 2023-09-20 DIAGNOSIS — L29.9 PRURITIC DISORDER: ICD-10-CM

## 2023-09-20 DIAGNOSIS — D25.9 UTERINE FIBROIDS AFFECTING PREGNANCY IN THIRD TRIMESTER: ICD-10-CM

## 2023-09-20 DIAGNOSIS — O34.13 UTERINE FIBROIDS AFFECTING PREGNANCY IN THIRD TRIMESTER: ICD-10-CM

## 2023-09-20 DIAGNOSIS — R51.9 PREGNANCY HEADACHE IN THIRD TRIMESTER: ICD-10-CM

## 2023-09-20 DIAGNOSIS — O26.893 PREGNANCY HEADACHE IN THIRD TRIMESTER: ICD-10-CM

## 2023-09-20 DIAGNOSIS — Z3A.31 31 WEEKS GESTATION OF PREGNANCY: ICD-10-CM

## 2023-09-20 DIAGNOSIS — Z13.32 ENCOUNTER FOR SCREENING FOR MATERNAL DEPRESSION: ICD-10-CM

## 2023-09-20 LAB
ALBUMIN SERPL-MCNC: 2.6 G/DL (ref 3.5–5)
ALBUMIN/GLOB SERPL: 0.6 (ref 0.4–1.6)
ALP SERPL-CCNC: 87 U/L (ref 50–136)
ALT SERPL-CCNC: 19 U/L (ref 12–65)
ANION GAP SERPL CALC-SCNC: 8 MMOL/L (ref 2–11)
AST SERPL-CCNC: 17 U/L (ref 15–37)
BILIRUB SERPL-MCNC: 0.2 MG/DL (ref 0.2–1.1)
BUN SERPL-MCNC: 14 MG/DL (ref 6–23)
CALCIUM SERPL-MCNC: 9.2 MG/DL (ref 8.3–10.4)
CHLORIDE SERPL-SCNC: 109 MMOL/L (ref 101–110)
CO2 SERPL-SCNC: 24 MMOL/L (ref 21–32)
CREAT SERPL-MCNC: 0.5 MG/DL (ref 0.6–1)
GLOBULIN SER CALC-MCNC: 4.4 G/DL (ref 2.8–4.5)
GLUCOSE SERPL-MCNC: 91 MG/DL (ref 65–100)
POTASSIUM SERPL-SCNC: 3.8 MMOL/L (ref 3.5–5.1)
PROT SERPL-MCNC: 7 G/DL (ref 6.3–8.2)
SODIUM SERPL-SCNC: 141 MMOL/L (ref 133–143)

## 2023-09-20 PROCEDURE — 76819 FETAL BIOPHYS PROFIL W/O NST: CPT | Performed by: OBSTETRICS & GYNECOLOGY

## 2023-09-20 PROCEDURE — 98960 EDU&TRN PT SELF-MGMT NQHP 1: CPT | Performed by: REGISTERED NURSE

## 2023-09-20 PROCEDURE — 96127 BRIEF EMOTIONAL/BEHAV ASSMT: CPT | Performed by: OBSTETRICS & GYNECOLOGY

## 2023-09-20 PROCEDURE — 76816 OB US FOLLOW-UP PER FETUS: CPT | Performed by: OBSTETRICS & GYNECOLOGY

## 2023-09-20 PROCEDURE — 99214 OFFICE O/P EST MOD 30 MIN: CPT | Performed by: OBSTETRICS & GYNECOLOGY

## 2023-09-20 RX ORDER — DIPHENHYDRAMINE HCL 25 MG
25 CAPSULE ORAL EVERY 6 HOURS PRN
COMMUNITY

## 2023-09-20 RX ORDER — ACETAMINOPHEN 500 MG
500 TABLET ORAL EVERY 6 HOURS PRN
COMMUNITY

## 2023-09-20 ASSESSMENT — PATIENT HEALTH QUESTIONNAIRE - PHQ9
SUM OF ALL RESPONSES TO PHQ QUESTIONS 1-9: 0
1. LITTLE INTEREST OR PLEASURE IN DOING THINGS: 0
SUM OF ALL RESPONSES TO PHQ QUESTIONS 1-9: 0
SUM OF ALL RESPONSES TO PHQ9 QUESTIONS 1 & 2: 0
2. FEELING DOWN, DEPRESSED OR HOPELESS: 0

## 2023-09-20 NOTE — PROGRESS NOTES
[address] [place] [address] [address]    4 [facility], [address] [place] [address] Hari  [phone]       [facility] Follow-up Visit  Roxan Gosselin (1990) is a 29 y.o. Adin Sera at [address] [facility] [phone] with 11/19/2023, by Other Basis. Presents for evaluation of the following chief complaint(s):   Chief Complaint   Patient presents with    High Risk Pregnancy     GDM, Fetal Hydronephrosis    Pregnancy Ultrasound     Growth and recheck fetal kidneys         Patient is working as a  at [employer]. , Rubia January, present today. Scheduled to see Primary OB Sumaya Caceres [facility]) on Friday 9/22/2023. No HAs, edema. Denies preeclamptic symptoms. Reports good fetal movement. No bleeding, LOF, cramping, ctxs, or vaginal pressure. Covid early September. Possibly brought home by daughter. Now with palm/soles pruritis. No change in detergent/soap/etc. No rash. ICP can be associated with COVID placentitis. Will evaluate. Right breast with induration/?engorgement of isolated duct at 10:00 position beginning Mon am. No trauma. No fevers/chills/systemic concerns. Trying to hand express per OB, minimal help. Sx ideas reviewed. If no improvement in next 24hr, needs breast ultrasound. Sees OB on Friday. Mood evaluated today based on discussion with pt and PHQ screen. 9/20/2023     4:09 PM   PHQ-9    Little interest or pleasure in doing things 0   Feeling down, depressed, or hopeless 0   PHQ-2 Score 0   PHQ-9 Total Score 0        Interval history since prior appt reviewed and updated as indicated. Addressed normal pregnancy complaints, reassured and offered suggestions for care  Reviewed gestational age precautions and activity goals/limitations  Nutritional counseling as well as specific goals based on current maternal and fetal status  Options for GERD, constipation, other common complaints reviewed.    Reviewed gestational age appropriate preventive care regarding communicable disease

## 2023-09-20 NOTE — PATIENT INSTRUCTIONS
Resources for Depression/Anxiety  Postpartum Support International (PSI). PSI Warmline:  5-384-855-4PPD (8950). WWW. POSTPARTUM. NET    Mom's IMPACTT  [email or web address]     Headache and Migraines in Pregnancy      Consideration of these OTC options (tylenol, caffeine, hydration, benadryl, mag oxide up to 800mg BID, riboflavin 400mg daily; elise-relief, excedrin migraine, allergy medications). Other non-pharm solutions - BeKool-type head patches, essential oils, dark room, warm compresses, mouthguard if jaw clenching/teeth grinding. If fails all OTC, then reglan/benadryl combo or imitrex/triptans recommended. If continued pain, recommend referral to neurology for additional recommendations. Opioid, including fiorcet, may be considered if fails all non-opiate medications. But these are not recommended for use due to rebound headaches and  withdrawal concerns.    Ergotamines are not recommended in pregnancy

## 2023-09-20 NOTE — PROGRESS NOTES
regarding low-carb diet, and meal options. Reviewed carb-counting, plate method, and regular meal times and snacks. Pt has had tight control with extensive dietary log of meals and carbs. May be too restrictive and not getting enough carbs. Discussed with pt about adding more back in and if needed, medication can be prescribed to help with glycemic control. Activity Review  Pt is active with her 11year old. Encourage to stay active post-meals. Goals Addressed                   This Visit's Progress     Appropriate diet and regular activity   Not on track     Diet may have been too restrictive. Reviewed. Discussed activity. Skills necessary to properly manage their diabetes, including use of devices and symptom monitoring tools. On track     Understands and monitors blood sugar levels   On track             35minutes spent on review of diabetic education by ALIE. I have reviewed the patient's diabetic assessment/plan by MUKESH Leach. I agree with treatment/plan as above. Kelly Zimmerman MD    It is the standard of care that patients with diabetes in pregnancy have glycemic control monitored weekly. It helps us better manage your gestational diabetes and overall care for you and your baby! This may be done in person with the diabetic educator/physician or may occur virtually via [email or web address]. Please remember to send your glucose log weekly to [facility] either through [email or web address]".[facility] Srikanth may receive a bill from [facility] for this service. An electronic signature was used to authenticate this note.   MUKESH Leach Ed, [facility]    Patient Active Problem List   Diagnosis Code    High-risk pregnancy O09.90    Uterine fibroids affecting pregnancy in third trimester O34.13, D25.9    Diet controlled gestational diabetes mellitus (GDM) in third trimester O24.410    Pregnancy headache in third trimester O26.893, R51.9    COVID-19

## 2023-09-20 NOTE — ASSESSMENT & PLAN NOTE
BG Log Review  BG log reviewed from the past week, have a few mild elevations, recommend BID testing with elevated parameters: FBG < 95 and PPs < 140. Diet Review  Spoke in length with patient regarding low-carb diet, and meal options. Reviewed carb-counting, plate method, and regular meal times and snacks. Pt has had tight control with extensive dietary log of meals and carbs. May be too restrictive and not getting enough carbs. Discussed with pt about adding more back in and if needed, medication can be prescribed to help with glycemic control. Activity Review  Pt is active with her 11year old. Encourage to stay active post-meals.

## 2023-09-20 NOTE — ASSESSMENT & PLAN NOTE
Was seen at triage for migraine cluster on 8/23/23 that was responsive to benadryl/Reglan combo. Pt last used on 9/12/23.

## 2023-09-22 LAB — BILE AC SERPL-SCNC: 4.9 UMOL/L (ref 0–10)

## 2023-10-17 ENCOUNTER — OFFICE VISIT (OUTPATIENT)
Dept: OBGYN CLINIC | Age: 33
End: 2023-10-17
Payer: COMMERCIAL

## 2023-10-17 ENCOUNTER — ROUTINE PRENATAL (OUTPATIENT)
Dept: OBGYN CLINIC | Age: 33
End: 2023-10-17
Payer: COMMERCIAL

## 2023-10-17 VITALS — DIASTOLIC BLOOD PRESSURE: 70 MMHG | HEART RATE: 92 BPM | SYSTOLIC BLOOD PRESSURE: 119 MMHG

## 2023-10-17 DIAGNOSIS — O98.513 COVID-19 AFFECTING PREGNANCY IN THIRD TRIMESTER: ICD-10-CM

## 2023-10-17 DIAGNOSIS — D25.9 UTERINE FIBROIDS AFFECTING PREGNANCY IN THIRD TRIMESTER: ICD-10-CM

## 2023-10-17 DIAGNOSIS — O34.13 UTERINE FIBROIDS AFFECTING PREGNANCY IN THIRD TRIMESTER: ICD-10-CM

## 2023-10-17 DIAGNOSIS — U07.1 COVID-19 AFFECTING PREGNANCY IN THIRD TRIMESTER: ICD-10-CM

## 2023-10-17 DIAGNOSIS — O09.93 HIGH-RISK PREGNANCY IN THIRD TRIMESTER: Primary | ICD-10-CM

## 2023-10-17 DIAGNOSIS — O24.410 DIET CONTROLLED GESTATIONAL DIABETES MELLITUS (GDM) IN THIRD TRIMESTER: Primary | ICD-10-CM

## 2023-10-17 DIAGNOSIS — O24.410 DIET CONTROLLED GESTATIONAL DIABETES MELLITUS (GDM) IN THIRD TRIMESTER: ICD-10-CM

## 2023-10-17 DIAGNOSIS — Z3A.35 35 WEEKS GESTATION OF PREGNANCY: ICD-10-CM

## 2023-10-17 PROCEDURE — 99214 OFFICE O/P EST MOD 30 MIN: CPT | Performed by: OBSTETRICS & GYNECOLOGY

## 2023-10-17 PROCEDURE — 96127 BRIEF EMOTIONAL/BEHAV ASSMT: CPT | Performed by: OBSTETRICS & GYNECOLOGY

## 2023-10-17 PROCEDURE — S9460 DIABETIC MANAGEMENT PROGRAM,: HCPCS | Performed by: OBSTETRICS & GYNECOLOGY

## 2023-10-17 PROCEDURE — 76816 OB US FOLLOW-UP PER FETUS: CPT | Performed by: OBSTETRICS & GYNECOLOGY

## 2023-10-17 PROCEDURE — 76819 FETAL BIOPHYS PROFIL W/O NST: CPT | Performed by: OBSTETRICS & GYNECOLOGY

## 2023-10-17 ASSESSMENT — PATIENT HEALTH QUESTIONNAIRE - PHQ9
SUM OF ALL RESPONSES TO PHQ QUESTIONS 1-9: 0
SUM OF ALL RESPONSES TO PHQ QUESTIONS 1-9: 0
SUM OF ALL RESPONSES TO PHQ9 QUESTIONS 1 & 2: 0
1. LITTLE INTEREST OR PLEASURE IN DOING THINGS: 0
SUM OF ALL RESPONSES TO PHQ QUESTIONS 1-9: 0
2. FEELING DOWN, DEPRESSED OR HOPELESS: 0
SUM OF ALL RESPONSES TO PHQ QUESTIONS 1-9: 0

## 2023-10-17 NOTE — PROGRESS NOTES
[address] [place] [address] [address]    2 [facility], [address] [place] [address] Hari  [phone]       [facility] Follow-up Visit    Peng July (1990) is a [address] [place] Fee at 35w2d with 11/19/2023, by Other Basis. Presents for evaluation of the following chief complaint(s):        Chief Complaint   Patient presents with    High Risk Pregnancy       GDM, Fetal Hydronephrosis    Pregnancy Ultrasound       Growth and recheck fetal kidneys          Patient is working as a  at [employer]. , Harsha Sam, present today. Scheduled to see Primary OB Zbigniew Bowens [facility]) on 10/23/2023. No HAs, edema. Denies preeclamptic symptoms. Reports good fetal movement. No bleeding, LOF, cramping, ctxs, or vaginal pressure. Covid early September. Possibly brought home by daughter. Mood evaluated today based on discussion with pt and PHQ screen. 10/17/2023     9:55 AM   PHQ-9    Little interest or pleasure in doing things 0   Feeling down, depressed, or hopeless 0   PHQ-2 Score 0   PHQ-9 Total Score 0        Interval history since prior appt reviewed and updated as indicated. Addressed normal pregnancy complaints, reassured and offered suggestions for care  Reviewed gestational age precautions and activity goals/limitations  Nutritional counseling as well as specific goals based on current maternal and fetal status  Options for GERD, constipation, other common complaints reviewed. Reviewed gestational age appropriate preventive care regarding communicable disease transmission and vaccines as appropriate (including flu, TDaP >28wk, RSV 32-36wk, and COVID.)  Mood counseling today    Exam:     Vitals:    10/17/23 1036   BP: 119/70   Pulse: 92      Applicable labs reviewed. Please see formal ultrasound report under imaging tab.       ASSESSMENT/PLAN:  Patient Active Problem List    Diagnosis Date Noted    COVID-19 affecting pregnancy in third trimester 09/13/2023     Overview

## 2023-10-17 NOTE — PROGRESS NOTES
Diet controlled gestational diabetes mellitus (GDM) in third trimester O24.410    Pregnancy headache in third trimester O26.893, R51.9    COVID-19 affecting pregnancy in third trimester O98.513, U07.1

## 2023-10-17 NOTE — ASSESSMENT & PLAN NOTE
BG Log Review  BG log reviewed from 10/4 to 10/16, BGs consistently in an acceptable range, recommend BID testing - fasting plus 1 rotating meal daily. Diet Review  Reviewed with patient regarding low-carb diet, and meal options. Discussed pt's meal habits. Reports eating  g/carbs per day. She does meal planning day by day and attempts to add different foods into the mix. Her weakness is pasta and potatoes, so pt works to limit their intake. Reminded patient to send in weekly logs. Activity Review  Pt just finished working for the remainder of her pregnancy as a . She states she was on her feet quite a bit. Most of her activity is house work and keeping up with her 11 yr old. Doesn't have structured exercise. She states that walking is hurting at this point in her pregnancy. Reviewed the importance of activity post-meals since it decreases the body's use of insulin. Medication Management  None at this time. Postpartum Care for Diabetics  Discussed with pt regarding long term risks that approximately 50% of women with GDM go on to develop type 2 diabetes per the [facility], December 2022. Stressed having a postpartum appointment with either previously established endocrinologist or primary care provider scheduled at least 3-6 months after you deliver to continue your care. Stressed reaching a healthy body weight after delivery, maintain activity at least 3x per week and have A1c drawn annually.

## 2023-11-05 ENCOUNTER — HOSPITAL ENCOUNTER (OUTPATIENT)
Age: 33
Discharge: HOME | End: 2023-11-06
Attending: STUDENT IN AN ORGANIZED HEALTH CARE EDUCATION/TRAINING PROGRAM | Admitting: OBSTETRICS & GYNECOLOGY
Payer: COMMERCIAL

## 2023-11-05 VITALS
RESPIRATION RATE: 16 BRPM | HEART RATE: 107 BPM | HEIGHT: 65 IN | BODY MASS INDEX: 29.16 KG/M2 | OXYGEN SATURATION: 98 % | TEMPERATURE: 98 F | WEIGHT: 175 LBS | DIASTOLIC BLOOD PRESSURE: 77 MMHG | SYSTOLIC BLOOD PRESSURE: 121 MMHG

## 2023-11-05 PROBLEM — Z3A.38 38 WEEKS GESTATION OF PREGNANCY: Status: ACTIVE | Noted: 2023-11-05

## 2023-11-05 PROBLEM — R10.9 ABDOMINAL PAIN DURING PREGNANCY IN THIRD TRIMESTER: Status: ACTIVE | Noted: 2023-09-06

## 2023-11-05 ASSESSMENT — ENCOUNTER SYMPTOMS
ABDOMINAL PAIN: 1
VOMITING: 0
NAUSEA: 0
CONSTIPATION: 0
DIARRHEA: 0

## 2023-11-06 PROBLEM — O47.1 FALSE LABOR AFTER 37 COMPLETED WEEKS OF GESTATION: Status: ACTIVE | Noted: 2023-11-06

## 2023-11-06 PROBLEM — Z03.71 ENCOUNTER FOR SUSPECTED PROM, WITH RUPTURE OF MEMBRANES NOT FOUND: Status: ACTIVE | Noted: 2023-11-06

## 2023-11-06 PROCEDURE — 99285 EMERGENCY DEPT VISIT HI MDM: CPT

## 2023-11-06 NOTE — PROGRESS NOTES
Provider Testing: nonstress test    Indications:  38.0 weeks,  contractions/abdominal pain, GDMA1    NST results[de-identified]  Reactive    EFM:  baseline 135, accelerations present,  decelerations absent, moderate variability  Tocos:  q 5-7 minutes, lasting   30-60 seconds, mild intensity    Start: 23:20 pm  End:  23:54    Category:  1

## 2023-11-06 NOTE — PROGRESS NOTES
OB ED     Pt to [facility] with complaints of contractions q 5-15  min apart. EFM and TOCO applied. Abd palpated soft.  Positive fetal movement noted, denies LOF or VB. [facility] notified of patient's arrival.

## 2023-11-13 ENCOUNTER — ANESTHESIA (OUTPATIENT)
Dept: LABOR AND DELIVERY | Age: 33
End: 2023-11-13
Payer: COMMERCIAL

## 2023-11-13 ENCOUNTER — APPOINTMENT (OUTPATIENT)
Dept: LABOR AND DELIVERY | Age: 33
End: 2023-11-13
Payer: COMMERCIAL

## 2023-11-13 ENCOUNTER — HOSPITAL ENCOUNTER (INPATIENT)
Age: 33
LOS: 2 days | Discharge: HOME | End: 2023-11-15
Attending: STUDENT IN AN ORGANIZED HEALTH CARE EDUCATION/TRAINING PROGRAM | Admitting: OBSTETRICS & GYNECOLOGY
Payer: COMMERCIAL

## 2023-11-13 ENCOUNTER — ANESTHESIA EVENT (OUTPATIENT)
Dept: LABOR AND DELIVERY | Age: 33
End: 2023-11-13
Payer: COMMERCIAL

## 2023-11-13 DIAGNOSIS — Z37.9 NORMAL LABOR: Primary | ICD-10-CM

## 2023-11-13 LAB
ABO + RH BLD: NORMAL
BASOPHILS # BLD: 0 K/UL (ref 0–0.2)
BASOPHILS NFR BLD: 0 % (ref 0–2)
BLOOD GROUP ANTIBODIES SERPL: NORMAL
DIFFERENTIAL METHOD BLD: ABNORMAL
EOSINOPHIL # BLD: 0.1 K/UL (ref 0–0.8)
EOSINOPHIL NFR BLD: 1 % (ref 0.5–7.8)
ERYTHROCYTE [DISTWIDTH] IN BLOOD BY AUTOMATED COUNT: 16.5 % (ref 11.9–14.6)
GLUCOSE BLD STRIP.AUTO-MCNC: 113 MG/DL (ref 65–100)
GLUCOSE BLD STRIP.AUTO-MCNC: 72 MG/DL (ref 65–100)
GLUCOSE BLD STRIP.AUTO-MCNC: 82 MG/DL (ref 65–100)
GLUCOSE BLD STRIP.AUTO-MCNC: 87 MG/DL (ref 65–100)
HCT VFR BLD AUTO: 36.9 % (ref 35.8–46.3)
HGB BLD-MCNC: 12.4 G/DL (ref 11.7–15.4)
IMM GRANULOCYTES # BLD AUTO: 0 K/UL (ref 0–0.5)
IMM GRANULOCYTES NFR BLD AUTO: 0 % (ref 0–5)
LYMPHOCYTES # BLD: 1.5 K/UL (ref 0.5–4.6)
LYMPHOCYTES NFR BLD: 16 % (ref 13–44)
MCH RBC QN AUTO: 29.5 PG (ref 26.1–32.9)
MCHC RBC AUTO-ENTMCNC: 33.6 G/DL (ref 31.4–35)
MCV RBC AUTO: 87.9 FL (ref 82–102)
MONOCYTES # BLD: 0.6 K/UL (ref 0.1–1.3)
MONOCYTES NFR BLD: 6 % (ref 4–12)
NEUTS SEG # BLD: 7.2 K/UL (ref 1.7–8.2)
NEUTS SEG NFR BLD: 76 % (ref 43–78)
NRBC # BLD: 0 K/UL (ref 0–0.2)
PLATELET # BLD AUTO: 227 K/UL (ref 150–450)
PMV BLD AUTO: 10.4 FL (ref 9.4–12.3)
RBC # BLD AUTO: 4.2 M/UL (ref 4.05–5.2)
SERVICE CMNT-IMP: ABNORMAL
SERVICE CMNT-IMP: NORMAL
SPECIMEN EXP DATE BLD: NORMAL
WBC # BLD AUTO: 9.4 K/UL (ref 4.3–11.1)

## 2023-11-13 PROCEDURE — 82962 GLUCOSE BLOOD TEST: CPT

## 2023-11-13 PROCEDURE — 4A1HXCZ MONITORING OF PRODUCTS OF CONCEPTION, CARDIAC RATE, EXTERNAL APPROACH: ICD-10-PCS | Performed by: STUDENT IN AN ORGANIZED HEALTH CARE EDUCATION/TRAINING PROGRAM

## 2023-11-13 PROCEDURE — 7100000011 HC PHASE II RECOVERY - ADDTL 15 MIN

## 2023-11-13 PROCEDURE — 86850 RBC ANTIBODY SCREEN: CPT

## 2023-11-13 PROCEDURE — 7210000100 HC LABOR FEE PER 1 HR

## 2023-11-13 PROCEDURE — 6370000000 HC RX 637 (ALT 250 FOR IP): Performed by: STUDENT IN AN ORGANIZED HEALTH CARE EDUCATION/TRAINING PROGRAM

## 2023-11-13 PROCEDURE — 51701 INSERT BLADDER CATHETER: CPT

## 2023-11-13 PROCEDURE — 6360000002 HC RX W HCPCS: Performed by: OBSTETRICS & GYNECOLOGY

## 2023-11-13 PROCEDURE — 2580000003 HC RX 258: Performed by: OBSTETRICS & GYNECOLOGY

## 2023-11-13 PROCEDURE — 85025 COMPLETE CBC W/AUTO DIFF WBC: CPT

## 2023-11-13 PROCEDURE — 1100000000 HC RM PRIVATE

## 2023-11-13 PROCEDURE — 6360000002 HC RX W HCPCS: Performed by: NURSE ANESTHETIST, CERTIFIED REGISTERED

## 2023-11-13 PROCEDURE — 86900 BLOOD TYPING SEROLOGIC ABO: CPT

## 2023-11-13 PROCEDURE — 86901 BLOOD TYPING SEROLOGIC RH(D): CPT

## 2023-11-13 PROCEDURE — 7100000010 HC PHASE II RECOVERY - FIRST 15 MIN

## 2023-11-13 PROCEDURE — 3700000025 EPIDURAL BLOCK: Performed by: ANESTHESIOLOGY

## 2023-11-13 PROCEDURE — 2500000003 HC RX 250 WO HCPCS: Performed by: NURSE ANESTHETIST, CERTIFIED REGISTERED

## 2023-11-13 PROCEDURE — 0KQM0ZZ REPAIR PERINEUM MUSCLE, OPEN APPROACH: ICD-10-PCS | Performed by: STUDENT IN AN ORGANIZED HEALTH CARE EDUCATION/TRAINING PROGRAM

## 2023-11-13 PROCEDURE — 7220000101 HC DELIVERY VAGINAL/SINGLE

## 2023-11-13 PROCEDURE — 3E0S3BZ INTRODUCTION OF ANESTHETIC AGENT INTO EPIDURAL SPACE, PERCUTANEOUS APPROACH: ICD-10-PCS | Performed by: ANESTHESIOLOGY

## 2023-11-13 RX ORDER — SIMETHICONE 80 MG
80 TABLET,CHEWABLE ORAL EVERY 6 HOURS PRN
Status: DISCONTINUED | OUTPATIENT
Start: 2023-11-13 | End: 2023-11-16 | Stop reason: HOSPADM

## 2023-11-13 RX ORDER — ONDANSETRON 2 MG/ML
4 INJECTION INTRAMUSCULAR; INTRAVENOUS EVERY 6 HOURS PRN
Status: DISCONTINUED | OUTPATIENT
Start: 2023-11-13 | End: 2023-11-16 | Stop reason: HOSPADM

## 2023-11-13 RX ORDER — ROPIVACAINE HYDROCHLORIDE 5 MG/ML
INJECTION, SOLUTION EPIDURAL; INFILTRATION; PERINEURAL PRN
Status: DISCONTINUED | OUTPATIENT
Start: 2023-11-13 | End: 2023-11-13 | Stop reason: SDUPTHER

## 2023-11-13 RX ORDER — ROPIVACAINE HYDROCHLORIDE 2 MG/ML
INJECTION, SOLUTION EPIDURAL; INFILTRATION; PERINEURAL CONTINUOUS PRN
Status: DISCONTINUED | OUTPATIENT
Start: 2023-11-13 | End: 2023-11-13 | Stop reason: SDUPTHER

## 2023-11-13 RX ORDER — SODIUM CHLORIDE, SODIUM LACTATE, POTASSIUM CHLORIDE, CALCIUM CHLORIDE 600; 310; 30; 20 MG/100ML; MG/100ML; MG/100ML; MG/100ML
INJECTION, SOLUTION INTRAVENOUS CONTINUOUS
Status: DISCONTINUED | OUTPATIENT
Start: 2023-11-13 | End: 2023-11-16 | Stop reason: HOSPADM

## 2023-11-13 RX ORDER — SODIUM CHLORIDE 0.9 % (FLUSH) 0.9 %
5-40 SYRINGE (ML) INJECTION PRN
Status: DISCONTINUED | OUTPATIENT
Start: 2023-11-13 | End: 2023-11-16 | Stop reason: HOSPADM

## 2023-11-13 RX ORDER — ACETAMINOPHEN 500 MG
1000 TABLET ORAL EVERY 6 HOURS
Status: DISCONTINUED | OUTPATIENT
Start: 2023-11-13 | End: 2023-11-16 | Stop reason: HOSPADM

## 2023-11-13 RX ORDER — POLYETHYLENE GLYCOL 3350 17 G/17G
17 POWDER, FOR SOLUTION ORAL DAILY
Status: DISCONTINUED | OUTPATIENT
Start: 2023-11-13 | End: 2023-11-16 | Stop reason: HOSPADM

## 2023-11-13 RX ORDER — SODIUM CHLORIDE 0.9 % (FLUSH) 0.9 %
5-40 SYRINGE (ML) INJECTION EVERY 12 HOURS SCHEDULED
Status: DISCONTINUED | OUTPATIENT
Start: 2023-11-13 | End: 2023-11-16 | Stop reason: HOSPADM

## 2023-11-13 RX ORDER — CARBOPROST TROMETHAMINE 250 UG/ML
250 INJECTION, SOLUTION INTRAMUSCULAR PRN
Status: DISCONTINUED | OUTPATIENT
Start: 2023-11-13 | End: 2023-11-16 | Stop reason: HOSPADM

## 2023-11-13 RX ORDER — ONDANSETRON 8 MG/1
8 TABLET, ORALLY DISINTEGRATING ORAL EVERY 8 HOURS PRN
Status: DISCONTINUED | OUTPATIENT
Start: 2023-11-13 | End: 2023-11-16 | Stop reason: HOSPADM

## 2023-11-13 RX ORDER — SODIUM CHLORIDE, SODIUM LACTATE, POTASSIUM CHLORIDE, AND CALCIUM CHLORIDE .6; .31; .03; .02 G/100ML; G/100ML; G/100ML; G/100ML
1000 INJECTION, SOLUTION INTRAVENOUS PRN
Status: DISCONTINUED | OUTPATIENT
Start: 2023-11-13 | End: 2023-11-16 | Stop reason: HOSPADM

## 2023-11-13 RX ORDER — SODIUM CHLORIDE, SODIUM LACTATE, POTASSIUM CHLORIDE, AND CALCIUM CHLORIDE .6; .31; .03; .02 G/100ML; G/100ML; G/100ML; G/100ML
500 INJECTION, SOLUTION INTRAVENOUS PRN
Status: DISCONTINUED | OUTPATIENT
Start: 2023-11-13 | End: 2023-11-16 | Stop reason: HOSPADM

## 2023-11-13 RX ORDER — SODIUM CHLORIDE 9 MG/ML
25 INJECTION, SOLUTION INTRAVENOUS PRN
Status: DISCONTINUED | OUTPATIENT
Start: 2023-11-13 | End: 2023-11-16 | Stop reason: HOSPADM

## 2023-11-13 RX ORDER — OXYCODONE HYDROCHLORIDE 5 MG/1
5 TABLET ORAL EVERY 4 HOURS PRN
Status: DISCONTINUED | OUTPATIENT
Start: 2023-11-13 | End: 2023-11-16 | Stop reason: HOSPADM

## 2023-11-13 RX ORDER — TRANEXAMIC ACID 10 MG/ML
1000 INJECTION, SOLUTION INTRAVENOUS
Status: ACTIVE | OUTPATIENT
Start: 2023-11-13 | End: 2023-11-14

## 2023-11-13 RX ORDER — SODIUM CHLORIDE 9 MG/ML
INJECTION, SOLUTION INTRAVENOUS PRN
Status: DISCONTINUED | OUTPATIENT
Start: 2023-11-13 | End: 2023-11-16 | Stop reason: HOSPADM

## 2023-11-13 RX ORDER — DOCUSATE SODIUM 100 MG/1
100 CAPSULE, LIQUID FILLED ORAL 2 TIMES DAILY PRN
Status: DISCONTINUED | OUTPATIENT
Start: 2023-11-13 | End: 2023-11-16 | Stop reason: HOSPADM

## 2023-11-13 RX ORDER — OXYCODONE HYDROCHLORIDE 5 MG/1
10 TABLET ORAL EVERY 4 HOURS PRN
Status: DISCONTINUED | OUTPATIENT
Start: 2023-11-13 | End: 2023-11-16 | Stop reason: HOSPADM

## 2023-11-13 RX ORDER — MISOPROSTOL 200 UG/1
800 TABLET ORAL PRN
Status: DISCONTINUED | OUTPATIENT
Start: 2023-11-13 | End: 2023-11-16 | Stop reason: HOSPADM

## 2023-11-13 RX ORDER — LIDOCAINE HYDROCHLORIDE AND EPINEPHRINE 15; 5 MG/ML; UG/ML
INJECTION, SOLUTION EPIDURAL PRN
Status: DISCONTINUED | OUTPATIENT
Start: 2023-11-13 | End: 2023-11-13 | Stop reason: SDUPTHER

## 2023-11-13 RX ORDER — FENTANYL CITRATE 50 UG/ML
INJECTION, SOLUTION INTRAMUSCULAR; INTRAVENOUS PRN
Status: DISCONTINUED | OUTPATIENT
Start: 2023-11-13 | End: 2023-11-13 | Stop reason: SDUPTHER

## 2023-11-13 RX ORDER — LANOLIN
CREAM (ML) TOPICAL PRN
Status: DISCONTINUED | OUTPATIENT
Start: 2023-11-13 | End: 2023-11-16 | Stop reason: HOSPADM

## 2023-11-13 RX ORDER — FAMOTIDINE 20 MG/1
20 TABLET, FILM COATED ORAL 2 TIMES DAILY
Status: DISCONTINUED | OUTPATIENT
Start: 2023-11-13 | End: 2023-11-16 | Stop reason: HOSPADM

## 2023-11-13 RX ORDER — DEXTROSE, SODIUM CHLORIDE, SODIUM LACTATE, POTASSIUM CHLORIDE, AND CALCIUM CHLORIDE 5; .6; .31; .03; .02 G/100ML; G/100ML; G/100ML; G/100ML; G/100ML
INJECTION, SOLUTION INTRAVENOUS CONTINUOUS
Status: DISCONTINUED | OUTPATIENT
Start: 2023-11-13 | End: 2023-11-16 | Stop reason: HOSPADM

## 2023-11-13 RX ORDER — IBUPROFEN 800 MG/1
800 TABLET ORAL EVERY 8 HOURS SCHEDULED
Status: DISCONTINUED | OUTPATIENT
Start: 2023-11-13 | End: 2023-11-16 | Stop reason: HOSPADM

## 2023-11-13 RX ORDER — ROPIVACAINE HYDROCHLORIDE 2 MG/ML
INJECTION, SOLUTION EPIDURAL; INFILTRATION; PERINEURAL PRN
Status: DISCONTINUED | OUTPATIENT
Start: 2023-11-13 | End: 2023-11-13

## 2023-11-13 RX ORDER — METHYLERGONOVINE MALEATE 0.2 MG/ML
200 INJECTION INTRAVENOUS PRN
Status: DISCONTINUED | OUTPATIENT
Start: 2023-11-13 | End: 2023-11-16 | Stop reason: HOSPADM

## 2023-11-13 RX ADMIN — Medication: at 18:19

## 2023-11-13 RX ADMIN — SODIUM CHLORIDE, SODIUM LACTATE, POTASSIUM CHLORIDE, AND CALCIUM CHLORIDE 500 ML: .6; .31; .03; .02 INJECTION, SOLUTION INTRAVENOUS at 10:30

## 2023-11-13 RX ADMIN — IBUPROFEN 800 MG: 800 TABLET, FILM COATED ORAL at 21:48

## 2023-11-13 RX ADMIN — LIDOCAINE HYDROCHLORIDE,EPINEPHRINE BITARTRATE 5 ML: 15; .005 INJECTION, SOLUTION EPIDURAL; INFILTRATION; INTRACAUDAL; PERINEURAL at 14:29

## 2023-11-13 RX ADMIN — OXYTOCIN 1 MILLI-UNITS/MIN: 10 INJECTION, SOLUTION INTRAMUSCULAR; INTRAVENOUS at 07:29

## 2023-11-13 RX ADMIN — ACETAMINOPHEN 1000 MG: 500 TABLET, FILM COATED ORAL at 18:20

## 2023-11-13 RX ADMIN — ROPIVACAINE HYDROCHLORIDE 5 ML: 5 INJECTION, SOLUTION EPIDURAL; INFILTRATION; PERINEURAL at 10:36

## 2023-11-13 RX ADMIN — FENTANYL CITRATE 100 MCG: 50 INJECTION, SOLUTION INTRAMUSCULAR; INTRAVENOUS at 14:29

## 2023-11-13 RX ADMIN — Medication 166.7 ML: at 15:07

## 2023-11-13 RX ADMIN — LIDOCAINE HYDROCHLORIDE,EPINEPHRINE BITARTRATE 3 ML: 15; .005 INJECTION, SOLUTION EPIDURAL; INFILTRATION; INTRACAUDAL; PERINEURAL at 10:38

## 2023-11-13 RX ADMIN — WITCH HAZEL: 500 SOLUTION RECTAL; TOPICAL at 18:19

## 2023-11-13 RX ADMIN — ROPIVACAINE HYDROCHLORIDE 8 ML/HR: 2 INJECTION, SOLUTION EPIDURAL; INFILTRATION; PERINEURAL at 10:41

## 2023-11-13 RX ADMIN — SODIUM CHLORIDE, SODIUM LACTATE, POTASSIUM CHLORIDE, CALCIUM CHLORIDE AND DEXTROSE MONOHYDRATE: 5; 600; 310; 30; 20 INJECTION, SOLUTION INTRAVENOUS at 07:11

## 2023-11-13 NOTE — OP NOTE
Delivery Note    Obstetrician:  Veronica Kitchen MD    Assistant: none    Pre-Delivery Diagnosis: Term pregnancy, Induced labor, Single fetus, and Pregnancy complicated by: Vamsi Cobble in pregnancy, fetal hydronephrosis    Post-Delivery Diagnosis: Living  infant(s) and Male    Intrapartum Event: None    Procedure: Spontaneous vaginal delivery    Epidural: yes    Monitor:  Fetal Heart Tones - External and Uterine Contractions - External    Indications for instrumental delivery: none    Estimated Blood Loss: see QBL    Episiotomy: n/a    Laceration(s):  2nd degree    Laceration(s) repair: routine 2nd degree repair    Presentation: Cephalic    Fetal Description: gibbons    Fetal Position: Occiput Anterior    Birth Weight: 8#2    Apgar - One Minute: 9    Apgar - Five Minutes: 9    Umbilical Cord: 3 vessels present  Specimens: none  Complications:  none           Cord Blood Results:   Information for the patient's : Farhan Washington [[id number]]   No results found for: \"PCTDIG\", \"BILI\"   Prenatal Labs:   No components found for: \"PCTABR\", \"OBEXTABSCRN\", \"OBEXTHBSAG\", \"OBEXTHIV\", \"OBEXTRUBELLA\", \"OBEXTRPR\", \"OBEXTGONORR\", \"OBEXTCHLAM\", \"OBEXTGRBS\"     Attending Attestation: I performed the procedure.

## 2023-11-13 NOTE — PROGRESS NOTES
AROM at 1200, clear fluid. SVE 1415: C/C/0, will labor down while acuity high on floor. Cat I overall with occ Cat II for variables. Will begin pushing soon, continue EFM monitoring closely.

## 2023-11-13 NOTE — ANESTHESIA PROCEDURE NOTES
Epidural Block    Patient location during procedure: OB  Start time: 11/13/2023 10:28 AM  End time: 11/13/2023 10:42 AM  Reason for block: labor epidural  Staffing  Performed: anesthesiologist   Anesthesiologist: Rakesh Bender MD  Performed by: Rakesh Bender MD  Authorized by: Rakesh Bender MD    Epidural  Patient position: sitting  Prep: ChloraPrep  Patient monitoring: continuous pulse ox and frequent blood pressure checks  Approach: midline  Location: L3-4  Injection technique: FRANKIE air  Provider prep: mask and sterile gloves  Needle  Needle type: Tuohy   Needle gauge: 17 G  Needle length: 3.5 in  Needle insertion depth: 5 cm  Catheter type: end hole  Catheter size: 19 G  Catheter at skin depth: 11 cm  Test dose: negative (Negative test dose with 5 mL 1.5% lidocaine with 1:200,000 epinephrine at 1038)Catheter Secured: tegaderm and tape  Assessment  Sensory level: T10  Hemodynamics: stable  Attempts: 1  Outcomes: uncomplicated  Additional Notes  Procedure Time Out at 1031  Preanesthetic Checklist  Completed: patient identified, IV checked, risks and benefits discussed, surgical/procedural consents, equipment checked, pre-op evaluation, timeout performed, anesthesia consent given, oxygen available and monitors applied/VS acknowledged

## 2023-11-13 NOTE — L&D DELIVERY NOTE
Instruments Count: Correct   Final Count Personnel: Abdi Kowalski; CST  Final Count Verified By: Mi Saldaña  Accurate Final Count?: Yes       Blood Loss  Mother: Amparo Huizar #[id number]     Start of Mother's Information      Delivery Blood Loss  23 1156 - 23 1741      Quantitative Blood Loss (mL) Hospital Encounter 600 grams    Total  600 mL               End of Mother's Information  Mother: Amparo Huizar #[id number]                Delivery Providers    Delivering clinician: Evelyn Cerda MD     Provider Role    Evelyn Cerda MD Obstetrician    Mady Scanlon, RN Primary Nurse    Braydon Anderson, RN Primary  Nurse    Gloria Soriano R Scrub Tech    Morales Dimas, RN Charge Nurse    Jonathan Broderick, DEREK Staff Nurse               Assessment    Living Status: Living  Delivery Location Comment: 430        Skin Color:   Heart Rate:   Reflex Irritability:   Muscle Tone:   Respiratory Effort:    Total:            1 Minute:    1    2    2    2    2    9         5 Minute:    1    2    2    2    2    9                                        Apgars Assigned By: Laura Frederick              Resuscitation    Method: Bulb Suction, Room Air, Stimulation              Measurements      Birth Weight: 3695 g   Birth Length: 51 cm     Head Circumference: 36.5 cm     Chest Circumference: 35.5 cm              Skin to Skin      Skin to Skin Initiation Date/Time: 23 15:02:00 EST     Skin to Skin With: Mother

## 2023-11-13 NOTE — H&P
History & Physical    Name: Jaylen Masterson MRN: [id number]  SSN: [id number]    YOB: 1990  Age: 34 y.o. Sex: female      Subjective:     Estimated Date of Delivery: 22  OB History    Para Term  AB Living   3 1 1   1 1   SAB IAB Ectopic Molar Multiple Live Births   1         1      # Outcome Date GA Lbr Rick/2nd Weight Sex Delivery Anes PTL Lv   3 Current            2 SAB 2023           1 Term 18 38w1d  3.15 kg (6 lb 15.1 oz) F Vag-Spont  N CINTIA       Ms. Chana Magallon is admitted with pregnancy at 39w6d for induction of labor due to favorable cervix at term, A1 gestational diabetes, and covid in pregnancy. Prenatal course was complicated by diabetes - gestational and fetal hydronephrosis . Please see prenatal records for details. Past Medical History:   Diagnosis Date    Intramural leiomyoma of uterus 1/10/2018    3.7cm, anterior, behind the placenta     Migraine      (spontaneous vaginal delivery) 2018     No past surgical history on file. Social History     Occupational History    Not on file   Tobacco Use    Smoking status: Former     Types: Cigarettes     Quit date:      Years since quittin.8    Smokeless tobacco: Never   Substance and Sexual Activity    Alcohol use: Not Currently    Drug use: Never    Sexual activity: Yes     Partners: Male     No family history on file. No Known Allergies  Prior to Admission medications    Medication Sig Start Date End Date Taking?  Authorizing Provider   Ascorbic Acid (VITAMIN C PO) Take by mouth    ProviderBud MD   Ferrous Sulfate (IRON PO) Take by mouth    ProviderBud MD   acetaminophen (TYLENOL) 500 MG tablet Take 1 tablet by mouth every 6 hours as needed for Pain    ProviderBud MD   diphenhydrAMINE (BENADRYL) 25 MG capsule Take 1 capsule by mouth every 6 hours as needed for Itching    ProviderBud MD   magnesium gluconate (MAGONATE) 500 MG tablet Take 200 mg by mouth once

## 2023-11-13 NOTE — ANESTHESIA PRE PROCEDURE
given: Not Answered      Vital Signs (Current):   Vitals:    11/13/23 1156 11/13/23 1210 11/13/23 1225 11/13/23 1240   BP: (!) 106/57 (!) 105/59 (!) 92/56 93/61   Pulse: 78 78 85 93   Resp:       Temp:       TempSrc:       SpO2:                                                  BP Readings from Last 3 Encounters:   11/13/23 93/61   11/05/23 121/77   10/17/23 119/70       NPO Status:                                                                                 BMI:   Wt Readings from Last 3 Encounters:   11/05/23 79.4 kg (175 lb)   08/22/23 76.7 kg (169 lb)   01/18/23 64.4 kg (142 lb)     There is no height or weight on file to calculate BMI.    CBC:   Lab Results   Component Value Date/Time    WBC 9.4 11/13/2023 06:31 AM    RBC 4.20 11/13/2023 06:31 AM    HGB 12.4 11/13/2023 06:31 AM    HCT 36.9 11/13/2023 06:31 AM    MCV 87.9 11/13/2023 06:31 AM    RDW 16.5 11/13/2023 06:31 AM     11/13/2023 06:31 AM       CMP:   Lab Results   Component Value Date/Time     09/20/2023 03:31 PM    K 3.8 09/20/2023 03:31 PM     09/20/2023 03:31 PM    CO2 24 09/20/2023 03:31 PM    BUN 14 09/20/2023 03:31 PM    CREATININE 0.50 09/20/2023 03:31 PM    LABGLOM >60 09/20/2023 03:31 PM    GLUCOSE 91 09/20/2023 03:31 PM    PROT 7.0 09/20/2023 03:31 PM    CALCIUM 9.2 09/20/2023 03:31 PM    BILITOT 0.2 09/20/2023 03:31 PM    ALKPHOS 87 09/20/2023 03:31 PM    AST 17 09/20/2023 03:31 PM    ALT 19 09/20/2023 03:31 PM       POC Tests:   Recent Labs     11/13/23  1046   POCGLU 82       Coags: No results found for: \"PROTIME\", \"INR\", \"APTT\"    HCG (If Applicable):   Lab Results   Component Value Date    PREGTESTUR Negative 10/22/2022    HCGQUANT 273 (H) 01/17/2023        ABGs: No results found for: \"PHART\", \"PO2ART\", \"QMZ1BKM\", \"VIZ4UIW\", \"BEART\", \"D0WMSGRG\"     Type & Screen (If Applicable):  No results found for: \"LABABO\", \"LABRH\"    Drug/Infectious Status (If Applicable):  No results found for: \"HIV\", \"HEPCAB\"    COVID-19

## 2023-11-13 NOTE — PROGRESS NOTES
Marly Pyle at bedside at 21 . ALFREDA Mcgovern at bedside at 1030    Assisted pt to sitting up on bedside at 1028. Timeout completed at 80 with MD, ALFREDA and myself at bedside. Test dose given at 1038. Negative reaction. Dose given at 1036. Pt assisted to lying back in left tilt position. See anesthesia record for details. See vital sign flow sheet for BP. Tolerated procedure well.

## 2023-11-14 PROCEDURE — 1100000000 HC RM PRIVATE

## 2023-11-14 PROCEDURE — 6370000000 HC RX 637 (ALT 250 FOR IP): Performed by: STUDENT IN AN ORGANIZED HEALTH CARE EDUCATION/TRAINING PROGRAM

## 2023-11-14 RX ORDER — OXYCODONE HYDROCHLORIDE 5 MG/1
5 TABLET ORAL EVERY 4 HOURS PRN
Qty: 20 TABLET | Refills: 0 | Status: SHIPPED | OUTPATIENT
Start: 2023-11-14 | End: 2023-11-17

## 2023-11-14 RX ORDER — IBUPROFEN 800 MG/1
800 TABLET ORAL EVERY 8 HOURS SCHEDULED
Qty: 120 TABLET | Refills: 3 | Status: SHIPPED | OUTPATIENT
Start: 2023-11-14

## 2023-11-14 RX ADMIN — ACETAMINOPHEN 1000 MG: 500 TABLET, FILM COATED ORAL at 01:13

## 2023-11-14 RX ADMIN — IBUPROFEN 800 MG: 800 TABLET, FILM COATED ORAL at 22:15

## 2023-11-14 RX ADMIN — IBUPROFEN 800 MG: 800 TABLET, FILM COATED ORAL at 14:50

## 2023-11-14 RX ADMIN — ACETAMINOPHEN 1000 MG: 500 TABLET, FILM COATED ORAL at 17:58

## 2023-11-14 RX ADMIN — IBUPROFEN 800 MG: 800 TABLET, FILM COATED ORAL at 06:17

## 2023-11-14 RX ADMIN — ACETAMINOPHEN 1000 MG: 500 TABLET, FILM COATED ORAL at 09:04

## 2023-11-14 NOTE — LACTATION NOTE

## 2023-11-14 NOTE — PROGRESS NOTES
Fundus remains firm, shifted right. Patient does not feel the urge to void and still feels slightly faint. In and Out Straight cath performed by Malcolm Garay RN with 800mL UOP. Fundus firm and midline after straight cath.

## 2023-11-14 NOTE — LACTATION NOTE
This note was copied from a baby's chart. Lactation visit. 2nd time mom, did not have success with breastfeeding first child. This baby not yet 24 hours old. Mom GDM, did informal collection of colostrum and brought that in frozen. Did feed baby some of the frozen colostrum overnight as blood glucose lower range or when mom not feeling well. Mom has pumped also. Mom reports baby has also latched well a several feeds. Reviewed feeding expectations. Feed every 3 hours, especially with GDM. Wake as needed. Offer the breast. Can give additional frozen colostrum if desired or if blood glucose lower range. Pump if not latching. Discussed supply and demand. Offered help with latching as needed today. Mom confident at this time, with latching and pumping. Of note, mom reports blister/milk bleb on left nipple. Appeared during pregnancy. Has not changed since delivery or with baby latching/pumping. Appears to likely be a milk bleb. Will need to watch as milk volume increasing. Not sore. Recommended epsom salt soak a few times per day, can also soak cotton ball in olive oil and keep that inside nursing pad against nipple to possibly soften skin covering. Can discuss with OB provider as well.

## 2023-11-14 NOTE — PROGRESS NOTES
Shift assessment completed by RN. Fundus firm, shifted right. RN assisted patient to bathroom. Patient ambulating without difficulty with stand by assist. While sitting on toilet in bathroom attempting to void, patient had syncopal episode. This RN with patient at the time and called for help, two other RN's to room to assist getting patient back to bed. VSS once patient back to bed (see flowsheet). Spot blood sugar checked, resulted 113. Patient able to sip on gingerale and feeling better once back in bed. RN will continue to monitor.

## 2023-11-14 NOTE — CARE COORDINATION
Chart reviewed - no needs identified. SW met with patient to complete initial assessment. Patient denied any history of postpartum depression/anxiety. Patient given informational packet on  mood & anxiety disorders (resources/education). Family denies any additional needs from  at this time. Family has 's contact information should any needs/questions arise.     JARETT Choudhary, [address] [place] [address]   [phone]

## 2023-11-15 VITALS
RESPIRATION RATE: 16 BRPM | HEART RATE: 99 BPM | TEMPERATURE: 98 F | DIASTOLIC BLOOD PRESSURE: 72 MMHG | SYSTOLIC BLOOD PRESSURE: 120 MMHG | OXYGEN SATURATION: 99 %

## 2023-11-15 PROCEDURE — 1100000000 HC RM PRIVATE

## 2023-11-15 PROCEDURE — 6370000000 HC RX 637 (ALT 250 FOR IP): Performed by: STUDENT IN AN ORGANIZED HEALTH CARE EDUCATION/TRAINING PROGRAM

## 2023-11-15 RX ADMIN — ACETAMINOPHEN 1000 MG: 500 TABLET, FILM COATED ORAL at 09:26

## 2023-11-15 RX ADMIN — ACETAMINOPHEN 1000 MG: 500 TABLET, FILM COATED ORAL at 00:31

## 2023-11-15 RX ADMIN — IBUPROFEN 800 MG: 800 TABLET, FILM COATED ORAL at 05:53

## 2023-11-15 NOTE — LACTATION NOTE
This note was copied from a baby's chart. In to see mom and infant for discharge. Gave parents printed feeding plan to review and follow at home for guidance. They verbalized understanding how to use. Encouraged outpatient lactation feed and weigh for reassessment and assistance as needed. Mom just fed baby and helped her give back extra warmed prior EBM to baby in syringe. Reviewed lactation discharge info and how to manage period of engorgement. Completed pump rental per request. No further needs at this time.

## 2023-11-15 NOTE — DISCHARGE SUMMARY
Obstetrical Discharge Summary     Name: Addis Valenzuela  MRN: [id number]   SSN: Marcin Tilley     YOB: 1990   Age: 34 y.o. Sex: female       Allergies: No Known Allergies     Admit Date:  2023     Discharge Date:       Admitting Physician: [unfilled]     Attending Physician:  Jessica Bui MD     * Admission Diagnoses:  Normal labor [O80, Z37.9]     * Discharge Diagnoses: Ana Louie [[id number]]      [place] Information    Head delivery date/time: 2023 15:02:56   Changing the 's delivery date/time could affect patient care.:      Delivery date/time:  23 1502   Delivery type: Vaginal, Spontaneous    Details:                       * Procedures: No admission procedures for hospital encounter. * Discharge Condition: [place] Hospital Course: Normal hospital course following the delivery. * Disposition: Home    Discharge Medications:      Medication List        START taking these medications      ibuprofen 800 MG tablet  Commonly known as: ADVIL;MOTRIN  Take 1 tablet by mouth every 8 hours     oxyCODONE 5 MG immediate release tablet  Commonly known as: ROXICODONE  Take 1 tablet by mouth every 4 hours as needed for Pain (20) for up to 3 days.  Max Daily Amount: 30 mg            CONTINUE taking these medications      acetaminophen 500 MG tablet  Commonly known as: TYLENOL     diphenhydrAMINE 25 MG capsule  Commonly known as: BENADRYL     IRON PO     magnesium gluconate 500 MG tablet  Commonly known as: MAGONATE     PRENATAL DHA PO     VITAMIN C PO            STOP taking these medications      cetirizine 10 MG tablet  Commonly known as: ZYRTEC     metoclopramide 5 MG tablet  Commonly known as: Reglan               Where to Get Your Medications        These medications were sent to [facility]/pharmacy #[id number], [address] [place] [address]      Hours: 24-hours Phone: [phone]

## 2023-11-15 NOTE — LACTATION NOTE

## 2024-07-19 ENCOUNTER — HOSPITAL ENCOUNTER (EMERGENCY)
Age: 34
Discharge: HOME | End: 2024-07-19
Attending: EMERGENCY MEDICINE
Payer: COMMERCIAL

## 2024-07-19 VITALS
SYSTOLIC BLOOD PRESSURE: 123 MMHG | DIASTOLIC BLOOD PRESSURE: 75 MMHG | WEIGHT: 150 LBS | HEIGHT: 65 IN | BODY MASS INDEX: 24.99 KG/M2 | RESPIRATION RATE: 18 BRPM | OXYGEN SATURATION: 100 % | TEMPERATURE: 97.5 F | HEART RATE: 75 BPM

## 2024-07-19 DIAGNOSIS — N93.8 DUB (DYSFUNCTIONAL UTERINE BLEEDING): Primary | ICD-10-CM

## 2024-07-19 LAB
ANION GAP SERPL CALC-SCNC: 15 MMOL/L (ref 2–11)
BASOPHILS # BLD: 0.1 K/UL (ref 0–0.2)
BASOPHILS NFR BLD: 1 % (ref 0–2)
BUN SERPL-MCNC: 8 MG/DL (ref 6–23)
CALCIUM SERPL-MCNC: 9.8 MG/DL (ref 8.3–10.4)
CHLORIDE SERPL-SCNC: 101 MMOL/L (ref 98–107)
CO2 SERPL-SCNC: 24 MMOL/L (ref 21–32)
CREAT SERPL-MCNC: 0.64 MG/DL (ref 0.6–1)
DIFFERENTIAL METHOD BLD: ABNORMAL
EOSINOPHIL # BLD: 0.4 K/UL (ref 0–0.8)
EOSINOPHIL NFR BLD: 4 % (ref 0.5–7.8)
ERYTHROCYTE [DISTWIDTH] IN BLOOD BY AUTOMATED COUNT: 13.2 % (ref 11.9–14.6)
GLUCOSE SERPL-MCNC: 106 MG/DL (ref 65–100)
HCG SERPL-ACNC: <1 MIU/ML (ref 0–6)
HCT VFR BLD AUTO: 42 % (ref 35.8–46.3)
HGB BLD-MCNC: 14.1 G/DL (ref 11.7–15.4)
IMM GRANULOCYTES # BLD AUTO: 0 K/UL (ref 0–0.5)
IMM GRANULOCYTES NFR BLD AUTO: 0 % (ref 0–5)
INR PPP: 1
LYMPHOCYTES # BLD: 1.7 K/UL (ref 0.5–4.6)
LYMPHOCYTES NFR BLD: 16 % (ref 13–44)
MCH RBC QN AUTO: 31.1 PG (ref 26.1–32.9)
MCHC RBC AUTO-ENTMCNC: 33.6 G/DL (ref 31.4–35)
MCV RBC AUTO: 92.5 FL (ref 82–102)
MONOCYTES # BLD: 0.6 K/UL (ref 0.1–1.3)
MONOCYTES NFR BLD: 6 % (ref 4–12)
NEUTS SEG # BLD: 7.4 K/UL (ref 1.7–8.2)
NEUTS SEG NFR BLD: 73 % (ref 43–78)
NRBC # BLD: 0 K/UL (ref 0–0.2)
PLATELET # BLD AUTO: 264 K/UL (ref 150–450)
PMV BLD AUTO: 9.2 FL (ref 9.4–12.3)
POTASSIUM SERPL-SCNC: 4.5 MMOL/L (ref 3.5–5.1)
PROTHROMBIN TIME: 13 SEC (ref 11.3–14.9)
RBC # BLD AUTO: 4.54 M/UL (ref 4.05–5.2)
SODIUM SERPL-SCNC: 140 MMOL/L (ref 133–143)
WBC # BLD AUTO: 10.2 K/UL (ref 4.3–11.1)

## 2024-07-19 PROCEDURE — 84702 CHORIONIC GONADOTROPIN TEST: CPT

## 2024-07-19 PROCEDURE — 99283 EMERGENCY DEPT VISIT LOW MDM: CPT

## 2024-07-19 PROCEDURE — 85610 PROTHROMBIN TIME: CPT

## 2024-07-19 PROCEDURE — 85025 COMPLETE CBC W/AUTO DIFF WBC: CPT

## 2024-07-19 PROCEDURE — 80048 BASIC METABOLIC PNL TOTAL CA: CPT

## 2024-07-19 PROCEDURE — 2580000003 HC RX 258: Performed by: EMERGENCY MEDICINE

## 2024-07-19 RX ORDER — 0.9 % SODIUM CHLORIDE 0.9 %
1000 INTRAVENOUS SOLUTION INTRAVENOUS ONCE
Status: COMPLETED | OUTPATIENT
Start: 2024-07-19 | End: 2024-07-19

## 2024-07-19 RX ORDER — TRAMADOL HYDROCHLORIDE 50 MG/1
50-100 TABLET ORAL EVERY 8 HOURS PRN
Qty: 12 TABLET | Refills: 0 | Status: SHIPPED | OUTPATIENT
Start: 2024-07-19 | End: 2024-07-22

## 2024-07-19 RX ORDER — HYOSCYAMINE SULFATE 0.125 MG
250 TABLET ORAL EVERY 6 HOURS PRN
Qty: 20 TABLET | Refills: 1 | Status: SHIPPED | OUTPATIENT
Start: 2024-07-19

## 2024-07-19 RX ORDER — BUTALBITAL, ACETAMINOPHEN AND CAFFEINE 50; 325; 40 MG/1; MG/1; MG/1
1-2 TABLET ORAL EVERY 6 HOURS PRN
Qty: 20 TABLET | Refills: 0 | Status: SHIPPED | OUTPATIENT
Start: 2024-07-19

## 2024-07-19 RX ORDER — IBUPROFEN 800 MG/1
800 TABLET ORAL EVERY 8 HOURS PRN
Qty: 21 TABLET | Refills: 0 | Status: SHIPPED | OUTPATIENT
Start: 2024-07-19

## 2024-07-19 RX ORDER — MEDROXYPROGESTERONE ACETATE 10 MG/1
10 TABLET ORAL DAILY
Qty: 5 TABLET | Refills: 0 | Status: SHIPPED | OUTPATIENT
Start: 2024-07-19 | End: 2024-07-24

## 2024-07-19 RX ADMIN — SODIUM CHLORIDE 1000 ML: 9 INJECTION, SOLUTION INTRAVENOUS at 16:25

## 2024-07-19 ASSESSMENT — PAIN SCALES - GENERAL
PAINLEVEL_OUTOF10: 3
PAINLEVEL_OUTOF10: 4

## 2024-07-19 ASSESSMENT — PAIN - FUNCTIONAL ASSESSMENT: PAIN_FUNCTIONAL_ASSESSMENT: 0-10

## 2024-07-19 ASSESSMENT — PAIN DESCRIPTION - LOCATION: LOCATION: BACK

## 2024-07-19 NOTE — ED TRIAGE NOTES
Pt ambulatory to triage for reports of vaginal bleeding. Pt states she had some spotting after her last period but reporting flunctuating, persistent bleeding for the past 2 weeks. Pt states she had sudden rush of bright red blood this afternoon while at her grandmother's . Pt with pregnancy 8 months ago. Pt also endorsing generalized weakness &  back pain. Pt reports hx of adenomyosis.

## 2024-07-19 NOTE — DISCHARGE INSTRUCTIONS
Start the Provera prescription tonight, 10 mg daily for 5 days, this should stop the bleeding while you are on it, and then reset your cycle on the sixth day for a new start date.    Tramadol, Motrin, Levsin for cramps and pain    Fioricet in case the progesterone hormone triggers any headaches

## 2024-07-19 NOTE — ED NOTES
Pt c/o weakness and fatigue for several days.  States that she is approx 8 months postpartum and had a period 2 weeks ago.  Continues to have some spotting and then had a gush of blood today

## 2024-07-26 ASSESSMENT — ENCOUNTER SYMPTOMS
NAUSEA: 0
COUGH: 0
SHORTNESS OF BREATH: 0
VOMITING: 0
EYE REDNESS: 0
FACIAL SWELLING: 0
BACK PAIN: 0
RHINORRHEA: 0
ABDOMINAL PAIN: 1
EYE DISCHARGE: 0
COLOR CHANGE: 0

## 2024-07-26 NOTE — ED PROVIDER NOTES
Years since quittin.5    Smokeless tobacco: Never   Substance and Sexual Activity    Alcohol use: Yes    Drug use: Never    Sexual activity: Yes     Partners: Male     Social Determinants of Health      Transportation Problems ([facility] HRSN)   Housing Stability: Low Risk  (2023)    Housing Stability Vital Sign     Unable to Pay for Housing in the Last Year: No     Number of Places Lived in the Last Year: 1     Unstable Housing in the Last Year: No        Discharge Medication List as of 2024  5:36 PM        CONTINUE these medications which have NOT CHANGED    Details   !! ibuprofen (ADVIL;MOTRIN) 800 MG tablet Take 1 tablet by mouth every 8 hours, Disp-120 tablet, R-3Normal      Ascorbic Acid (VITAMIN C PO) Take by mouthHistorical Med      Ferrous Sulfate (IRON PO) Take by mouthHistorical Med      acetaminophen (TYLENOL) 500 MG tablet Take 1 tablet by mouth every 6 hours as needed for PainHistorical Med      diphenhydrAMINE (BENADRYL) 25 MG capsule Take 1 capsule by mouth every 6 hours as needed for ItchingHistorical Med      magnesium gluconate (MAGONATE) 500 MG tablet Take 200 mg by mouth onceHistorical Med      Docosahexaenoic Acid (PRENATAL DHA PO) Take by mouthHistorical Med       !! - Potential duplicate medications found. Please discuss with provider.           Results for orders placed or performed during the hospital encounter of 24   CBC with Auto Differential   Result Value Ref Range    WBC 10.2 4.3 - 11.1 K/uL    RBC 4.54 4.05 - 5.20 M/uL    Hemoglobin 14.1 11.7 - 15.4 g/dL    Hematocrit 42.0 35.8 - 46.3 %    MCV 92.5 82.0 - 102.0 FL    MCH 31.1 26.1 - 32.9 PG    MCHC 33.6 31.4 - 35.0 g/dL    RDW 13.2 11.9 - 14.6 %    Platelets 264 150 - 450 K/uL    MPV 9.2 (L) 9.4 - 12.3 FL    nRBC 0.00 0.0 - 0.2 K/uL    Differential Type AUTOMATED      Neutrophils % 73 43 - 78 %    Lymphocytes % 16 13 - 44 %    Monocytes % 6 4.0 - 12.0 %    Eosinophils % 4 0.5 - 7.8 %    Basophils % 1 0.0 - 2.0 %